# Patient Record
Sex: MALE | Race: WHITE | Employment: OTHER | URBAN - METROPOLITAN AREA
[De-identification: names, ages, dates, MRNs, and addresses within clinical notes are randomized per-mention and may not be internally consistent; named-entity substitution may affect disease eponyms.]

---

## 2017-04-05 ENCOUNTER — OFFICE VISIT (OUTPATIENT)
Dept: ORTHOPEDIC SURGERY | Age: 62
End: 2017-04-05

## 2017-04-05 VITALS
SYSTOLIC BLOOD PRESSURE: 134 MMHG | WEIGHT: 200 LBS | HEIGHT: 73 IN | HEART RATE: 63 BPM | DIASTOLIC BLOOD PRESSURE: 89 MMHG | BODY MASS INDEX: 26.51 KG/M2

## 2017-04-05 DIAGNOSIS — M25.511 ACUTE PAIN OF RIGHT SHOULDER: ICD-10-CM

## 2017-04-05 DIAGNOSIS — M19.011 PRIMARY OSTEOARTHRITIS, RIGHT SHOULDER: Primary | ICD-10-CM

## 2017-04-05 PROCEDURE — L3670 SO ACRO/CLAV CAN WEB PRE OTS: HCPCS | Performed by: ORTHOPAEDIC SURGERY

## 2017-04-05 PROCEDURE — 99213 OFFICE O/P EST LOW 20 MIN: CPT | Performed by: ORTHOPAEDIC SURGERY

## 2017-04-24 ENCOUNTER — TELEPHONE (OUTPATIENT)
Dept: ORTHOPEDIC SURGERY | Age: 62
End: 2017-04-24

## 2017-04-24 ENCOUNTER — HOSPITAL ENCOUNTER (OUTPATIENT)
Dept: PREADMISSION TESTING | Age: 62
Discharge: HOME | End: 2017-04-24
Attending: ORTHOPAEDIC SURGERY | Admitting: ORTHOPAEDIC SURGERY

## 2017-04-24 VITALS
HEART RATE: 69 BPM | TEMPERATURE: 97 F | OXYGEN SATURATION: 97 % | DIASTOLIC BLOOD PRESSURE: 81 MMHG | RESPIRATION RATE: 20 BRPM | BODY MASS INDEX: 27.57 KG/M2 | SYSTOLIC BLOOD PRESSURE: 128 MMHG | HEIGHT: 73 IN | WEIGHT: 208 LBS

## 2017-04-24 LAB
EKG ATRIAL RATE: 59 BPM
EKG DIAGNOSIS: NORMAL
EKG P AXIS: 67 DEGREES
EKG P-R INTERVAL: 198 MS
EKG Q-T INTERVAL: 406 MS
EKG QRS DURATION: 94 MS
EKG QTC CALCULATION (BAZETT): 401 MS
EKG R AXIS: 57 DEGREES
EKG T AXIS: 44 DEGREES
EKG VENTRICULAR RATE: 59 BPM

## 2017-04-24 PROCEDURE — 93010 ELECTROCARDIOGRAM REPORT: CPT | Performed by: INTERNAL MEDICINE

## 2017-04-24 RX ORDER — CHLORHEXIDINE GLUCONATE 0.12 MG/ML
15 RINSE ORAL 2 TIMES DAILY
Status: CANCELLED | OUTPATIENT
Start: 2017-04-24

## 2017-04-24 RX ORDER — SODIUM CHLORIDE, SODIUM LACTATE, POTASSIUM CHLORIDE, CALCIUM CHLORIDE 600; 310; 30; 20 MG/100ML; MG/100ML; MG/100ML; MG/100ML
INJECTION, SOLUTION INTRAVENOUS CONTINUOUS
Status: CANCELLED | OUTPATIENT
Start: 2017-04-24

## 2017-05-04 DIAGNOSIS — M75.102 ROTATOR CUFF TEAR ARTHROPATHY, LEFT: Primary | ICD-10-CM

## 2017-05-04 DIAGNOSIS — M12.812 ROTATOR CUFF TEAR ARTHROPATHY, LEFT: Primary | ICD-10-CM

## 2017-05-05 PROBLEM — Z96.619 S/P SHOULDER JOINT REPLACEMENT: Status: ACTIVE | Noted: 2017-05-05

## 2017-05-10 ENCOUNTER — OFFICE VISIT (OUTPATIENT)
Dept: ORTHOPEDIC SURGERY | Age: 62
End: 2017-05-10

## 2017-05-10 ENCOUNTER — HOSPITAL ENCOUNTER (OUTPATIENT)
Dept: PHYSICAL THERAPY | Age: 62
Discharge: HOME | End: 2017-05-31
Admitting: ORTHOPAEDIC SURGERY

## 2017-05-10 VITALS
HEART RATE: 75 BPM | BODY MASS INDEX: 27.17 KG/M2 | DIASTOLIC BLOOD PRESSURE: 83 MMHG | SYSTOLIC BLOOD PRESSURE: 134 MMHG | WEIGHT: 205 LBS | HEIGHT: 73 IN

## 2017-05-10 DIAGNOSIS — Z96.611 S/P SHOULDER REPLACEMENT, RIGHT: ICD-10-CM

## 2017-05-10 DIAGNOSIS — M25.511 RIGHT SHOULDER PAIN, UNSPECIFIED CHRONICITY: Primary | ICD-10-CM

## 2017-05-10 PROBLEM — Z96.619 S/P SHOULDER REPLACEMENT: Status: ACTIVE | Noted: 2017-05-10

## 2017-05-10 PROCEDURE — 99024 POSTOP FOLLOW-UP VISIT: CPT | Performed by: ORTHOPAEDIC SURGERY

## 2017-05-10 PROCEDURE — 73030 X-RAY EXAM OF SHOULDER: CPT | Performed by: ORTHOPAEDIC SURGERY

## 2017-05-12 ENCOUNTER — HOSPITAL ENCOUNTER (OUTPATIENT)
Dept: PHYSICAL THERAPY | Age: 62
Discharge: HOME | End: 2017-05-12
Admitting: ORTHOPAEDIC SURGERY

## 2017-05-16 ENCOUNTER — HOSPITAL ENCOUNTER (OUTPATIENT)
Dept: PHYSICAL THERAPY | Age: 62
Discharge: HOME | End: 2017-05-16
Admitting: ORTHOPAEDIC SURGERY

## 2017-05-19 ENCOUNTER — HOSPITAL ENCOUNTER (OUTPATIENT)
Dept: PHYSICAL THERAPY | Age: 62
Discharge: HOME | End: 2017-05-19
Admitting: ORTHOPAEDIC SURGERY

## 2017-05-23 ENCOUNTER — HOSPITAL ENCOUNTER (OUTPATIENT)
Dept: PHYSICAL THERAPY | Age: 62
Discharge: HOME | End: 2017-05-23
Admitting: ORTHOPAEDIC SURGERY

## 2017-05-24 ENCOUNTER — OFFICE VISIT (OUTPATIENT)
Dept: ORTHOPEDIC SURGERY | Age: 62
End: 2017-05-24

## 2017-05-24 VITALS
SYSTOLIC BLOOD PRESSURE: 139 MMHG | HEIGHT: 73 IN | HEART RATE: 70 BPM | BODY MASS INDEX: 27.17 KG/M2 | WEIGHT: 205 LBS | DIASTOLIC BLOOD PRESSURE: 81 MMHG

## 2017-05-24 DIAGNOSIS — Z96.611 S/P SHOULDER REPLACEMENT, RIGHT: Primary | ICD-10-CM

## 2017-05-24 PROCEDURE — 99024 POSTOP FOLLOW-UP VISIT: CPT | Performed by: ORTHOPAEDIC SURGERY

## 2017-06-02 ENCOUNTER — HOSPITAL ENCOUNTER (OUTPATIENT)
Dept: PHYSICAL THERAPY | Age: 62
Discharge: HOME | End: 2017-06-02
Admitting: ORTHOPAEDIC SURGERY

## 2017-06-06 ENCOUNTER — HOSPITAL ENCOUNTER (OUTPATIENT)
Dept: PHYSICAL THERAPY | Age: 62
Discharge: HOME | End: 2017-06-06
Admitting: ORTHOPAEDIC SURGERY

## 2017-06-06 DIAGNOSIS — Z96.611 S/P SHOULDER REPLACEMENT, RIGHT: Primary | ICD-10-CM

## 2017-06-06 PROCEDURE — MISCPULLYB&C PULLY'S B&C: Performed by: ORTHOPAEDIC SURGERY

## 2017-06-13 ENCOUNTER — HOSPITAL ENCOUNTER (OUTPATIENT)
Dept: PHYSICAL THERAPY | Age: 62
Discharge: HOME | End: 2017-06-13
Admitting: ORTHOPAEDIC SURGERY

## 2017-06-20 ENCOUNTER — HOSPITAL ENCOUNTER (OUTPATIENT)
Dept: PHYSICAL THERAPY | Age: 62
Discharge: HOME | End: 2017-06-20
Admitting: ORTHOPAEDIC SURGERY

## 2017-06-21 ENCOUNTER — OFFICE VISIT (OUTPATIENT)
Dept: ORTHOPEDIC SURGERY | Age: 62
End: 2017-06-21

## 2017-06-21 VITALS
HEART RATE: 72 BPM | BODY MASS INDEX: 27.17 KG/M2 | HEIGHT: 73 IN | WEIGHT: 205 LBS | DIASTOLIC BLOOD PRESSURE: 78 MMHG | SYSTOLIC BLOOD PRESSURE: 131 MMHG

## 2017-06-21 DIAGNOSIS — Z96.611 S/P SHOULDER REPLACEMENT, RIGHT: Primary | ICD-10-CM

## 2017-06-21 PROCEDURE — 99024 POSTOP FOLLOW-UP VISIT: CPT | Performed by: ORTHOPAEDIC SURGERY

## 2017-06-21 PROCEDURE — 73030 X-RAY EXAM OF SHOULDER: CPT | Performed by: ORTHOPAEDIC SURGERY

## 2017-07-03 ENCOUNTER — HOSPITAL ENCOUNTER (OUTPATIENT)
Dept: PHYSICAL THERAPY | Age: 62
Discharge: HOME | End: 2017-07-03
Admitting: ORTHOPAEDIC SURGERY

## 2017-07-10 ENCOUNTER — HOSPITAL ENCOUNTER (OUTPATIENT)
Dept: PHYSICAL THERAPY | Age: 62
Discharge: HOME | End: 2017-07-10
Admitting: ORTHOPAEDIC SURGERY

## 2017-07-17 ENCOUNTER — HOSPITAL ENCOUNTER (OUTPATIENT)
Dept: PHYSICAL THERAPY | Age: 62
Discharge: HOME | End: 2017-07-17
Admitting: ORTHOPAEDIC SURGERY

## 2017-07-24 ENCOUNTER — HOSPITAL ENCOUNTER (OUTPATIENT)
Dept: PHYSICAL THERAPY | Age: 62
Discharge: HOME | End: 2017-07-24
Admitting: ORTHOPAEDIC SURGERY

## 2017-07-26 ENCOUNTER — OFFICE VISIT (OUTPATIENT)
Dept: ORTHOPEDIC SURGERY | Age: 62
End: 2017-07-26

## 2017-07-26 VITALS
HEART RATE: 62 BPM | DIASTOLIC BLOOD PRESSURE: 81 MMHG | SYSTOLIC BLOOD PRESSURE: 125 MMHG | HEIGHT: 73 IN | BODY MASS INDEX: 27.17 KG/M2 | WEIGHT: 205 LBS

## 2017-07-26 DIAGNOSIS — Z96.611 S/P SHOULDER REPLACEMENT, RIGHT: Primary | ICD-10-CM

## 2017-07-26 DIAGNOSIS — M25.511 RIGHT SHOULDER PAIN, UNSPECIFIED CHRONICITY: ICD-10-CM

## 2017-07-26 PROCEDURE — 99024 POSTOP FOLLOW-UP VISIT: CPT | Performed by: ORTHOPAEDIC SURGERY

## 2017-08-15 ENCOUNTER — HOSPITAL ENCOUNTER (OUTPATIENT)
Dept: PHYSICAL THERAPY | Age: 62
Discharge: HOME | End: 2017-08-15
Admitting: ORTHOPAEDIC SURGERY

## 2017-08-22 ENCOUNTER — HOSPITAL ENCOUNTER (OUTPATIENT)
Dept: PHYSICAL THERAPY | Age: 62
Discharge: HOME | End: 2017-08-22
Admitting: ORTHOPAEDIC SURGERY

## 2017-09-06 ENCOUNTER — OFFICE VISIT (OUTPATIENT)
Dept: ORTHOPEDIC SURGERY | Age: 62
End: 2017-09-06

## 2017-09-06 VITALS
DIASTOLIC BLOOD PRESSURE: 83 MMHG | WEIGHT: 205 LBS | SYSTOLIC BLOOD PRESSURE: 127 MMHG | BODY MASS INDEX: 27.17 KG/M2 | HEART RATE: 68 BPM | HEIGHT: 73 IN

## 2017-09-06 DIAGNOSIS — Z96.611 S/P SHOULDER REPLACEMENT, RIGHT: Primary | ICD-10-CM

## 2017-09-06 PROCEDURE — 99213 OFFICE O/P EST LOW 20 MIN: CPT | Performed by: ORTHOPAEDIC SURGERY

## 2021-10-27 ENCOUNTER — OFFICE VISIT (OUTPATIENT)
Dept: ORTHOPEDIC SURGERY | Age: 66
End: 2021-10-27
Payer: COMMERCIAL

## 2021-10-27 VITALS — BODY MASS INDEX: 27.57 KG/M2 | HEIGHT: 73 IN | WEIGHT: 208 LBS

## 2021-10-27 DIAGNOSIS — Z96.611 STATUS POST REPLACEMENT OF RIGHT SHOULDER JOINT: Primary | ICD-10-CM

## 2021-10-27 DIAGNOSIS — M19.012 PRIMARY OSTEOARTHRITIS OF LEFT SHOULDER: ICD-10-CM

## 2021-10-27 DIAGNOSIS — M25.512 LEFT SHOULDER PAIN, UNSPECIFIED CHRONICITY: ICD-10-CM

## 2021-10-27 PROBLEM — H25.13 AGE-RELATED NUCLEAR CATARACT, BILATERAL: Status: ACTIVE | Noted: 2021-10-27

## 2021-10-27 PROBLEM — M19.011 OSTEOARTHRITIS OF RIGHT SHOULDER: Status: ACTIVE | Noted: 2017-04-28

## 2021-10-27 PROBLEM — T15.12XA FOREIGN BODY OF LEFT CONJUNCTIVAL SAC: Status: ACTIVE | Noted: 2021-10-27

## 2021-10-27 PROBLEM — R03.0 ELEVATED BLOOD PRESSURE READING WITHOUT DIAGNOSIS OF HYPERTENSION: Status: ACTIVE | Noted: 2021-10-27

## 2021-10-27 PROBLEM — J30.9 ALLERGIC RHINITIS: Status: ACTIVE | Noted: 2021-10-27

## 2021-10-27 PROBLEM — I70.90 ATHEROSCLEROSIS OF ARTERIES: Status: ACTIVE | Noted: 2019-01-21

## 2021-10-27 PROCEDURE — 20610 DRAIN/INJ JOINT/BURSA W/O US: CPT | Performed by: ORTHOPAEDIC SURGERY

## 2021-10-27 PROCEDURE — 99203 OFFICE O/P NEW LOW 30 MIN: CPT | Performed by: ORTHOPAEDIC SURGERY

## 2021-10-27 RX ORDER — METHYLPREDNISOLONE ACETATE 40 MG/ML
80 INJECTION, SUSPENSION INTRA-ARTICULAR; INTRALESIONAL; INTRAMUSCULAR; SOFT TISSUE ONCE
Status: COMPLETED | OUTPATIENT
Start: 2021-10-27 | End: 2021-10-27

## 2021-10-27 RX ORDER — LIDOCAINE HYDROCHLORIDE 10 MG/ML
8 INJECTION, SOLUTION INFILTRATION; PERINEURAL ONCE
Status: COMPLETED | OUTPATIENT
Start: 2021-10-27 | End: 2021-10-27

## 2021-10-27 RX ORDER — MELOXICAM 15 MG/1
15 TABLET ORAL DAILY PRN
Qty: 30 TABLET | Refills: 3 | Status: SHIPPED | OUTPATIENT
Start: 2021-10-27 | End: 2022-06-22 | Stop reason: ALTCHOICE

## 2021-10-27 RX ADMIN — METHYLPREDNISOLONE ACETATE 80 MG: 40 INJECTION, SUSPENSION INTRA-ARTICULAR; INTRALESIONAL; INTRAMUSCULAR; SOFT TISSUE at 16:52

## 2021-10-27 RX ADMIN — LIDOCAINE HYDROCHLORIDE 8 ML: 10 INJECTION, SOLUTION INFILTRATION; PERINEURAL at 16:51

## 2021-10-27 NOTE — PROGRESS NOTES
12 [facility]  History and Physical  Shoulder Pain    Date:  10/27/2021    Name:  Jovi Cordero  Address:  [address] [place] [address]    :  1955      Age:   63 y.o.    SSN:  [id number]      Medical Record Number:  <[id number]>    Reason for Visit:    Shoulder Pain (OP/NP LEFT SHOULDER, HX RIGHT TOTAL SHOULDER)      HPI:   Jovi Cordero is a 63 y.o. male who presents to our office today for follow up of the bilateral shoulder pain. Patient has a history of undergoing a right anatomic total shoulder arthroplasty on 2017. He reports he is doing well with the right side and has no questions or concerns regarding that side. He reports the left shoulder has been bothering him and would like to have this looked at. He feels that he has adequate movement and strength but has pain that gets worse with certain movements and at nighttime. He denies any injuries. He has not been taking any medications for this. Pain Assessment  Location of Pain: Shoulder  Location Modifiers: Left  Severity of Pain: 0  Quality of Pain: Dull, Sharp, Other (Comment)  Duration of Pain: A few minutes  Frequency of Pain: Intermittent  Aggravating Factors: Other (Comment)  Limiting Behavior: Yes  Relieving Factors: Rest  Work-Related Injury: No  Are there other pain locations you wish to document?: No    Musculoskeletal ROS: positive for - pain in left shoulder          Review of Systems:  A 14 point review of systems available in the scanned medical record as documented by the patient on 10/27/2021. The review is negative with the exception of those things mentioned in the History of Present Illness and Past Medical History. Past Medical History:  Patient's medications, allergies, past medical, surgical, social and family histories were reviewed and updated as appropriate. Allergies:   Allergies   Allergen Reactions    Ciprofloxacin Other (See Comments) cramping       Physical Exam:  There were no vitals filed for this visit. General: Raquel Gan is a healthy and well appearing 63 y.o. male who is sitting comfortably in our office in acute distress. Skin:  There are no skin lesions, cellulitis, or extreme edema. The patient has warm and well-perfused Bilateral upper extremities with brisk capillary refill. Eyes: Extra-ocular muscles intact  Mouth: Oral mucosa moist. No perioral lesions  Pulm: Respirations unlabored and regular. Neuro: Alert and oriented x3    Bilateral shoulder Exam:  Inspection:  No gross deformities, no signs of infection. Palpation: He does have glenohumeral crepitus of the left shoulder. No crepitus in the right shoulder. Left Active Range of Motion: Forward elevation of 150, abduction of 145, external rotation with elbow at the side 15/20, internal rotation to the back is T10    Right Active Range of Motion: Forward elevation of 150, external rotation with elbow at the side 15/20, internal rotation to the back is L1  Passive Range of Motion: deferred. Strength: External rotation with resistance with elbow at the side 4/5 on the right versus 5/5 on the left, internal rotation with resistance with elbow at the side 5/5, champagne test test 5/5    Special Tests:   No Onofre muscle deformity. Neurovascular: Sensation to light touch is intact, no motor deficits, palpable radial pulses 2+    Additional Examinations:    Examination of the contralateral extremity does not show any tenderness, deformity or injury. Range of motion is unremarkable. There is no gross instability. There are no rashes, ulcerations or lesions. Strength and tone are normal.      Radiographic:  3 xray views of the bilateral  shoulder including True AP in internal and external and axillary lateral were taken in our office today reveals a right anatomic total shoulder arthroplasty. All the components are in good placement without any signs of loosening. The left shoulder shows moderate to severe degenerative changes within the glenohumeral joint along with a bone spur. No fractures, dislocations, visible tumors, or signs of acute trauma. Self assessment questionnaires including ASES and Simple Shoulder Test were completed today. Assessment:  Mary Leigh is a 63 y.o. male with a history of undergoing a right anatomic total shoulder arthroplasty on 5/5/2017 which continues to do well and currently with increasing left shoulder pain related to primary osteoarthritis of the glenohumeral joint. .    Impression:  Encounter Diagnoses   Name Primary?  Status post replacement of right shoulder joint Yes    Left shoulder pain, unspecified chronicity     Primary osteoarthritis of left shoulder        Office Procedures:  Orders Placed This Encounter   Procedures    XR SHOULDER LEFT (MIN 2 VIEWS)     Standing Status:   Future     Number of Occurrences:   1     Standing Expiration Date:   10/27/2022     Order Specific Question:   Reason for exam:     Answer:   pain     Order Specific Question:   Reason for exam:     Answer:   IN WAITING AREA    XR SHOULDER RIGHT (MIN 2 VIEWS)     Standing Status:   Future     Number of Occurrences:   1     Standing Expiration Date:   10/27/2022     Order Specific Question:   Reason for exam:     Answer:   f/u    RI ARTHROCENTESIS ASPIR&/INJ MAJOR JT/BURSA W/O US     After discussing the risks and benefits of a corticosteroid injection, Kaila Cardona did state an understanding and gave verbal consent to proceed. After cleansing the injection site with Chlora-prep and using aseptic techniques,  2 CC of Depo Medrol 40mg/ml and 8 CC of 1% lidocaine were injected in the left glenohumeral joint. He  tolerated the procedure well with no immediate adverse sequelae after the injection. A bandage was placed over the injection site. Appropriate post injections instructions were given to the patient.       Plan:   Mary Leigh is doing really well with his right total shoulder and may continue to perform activities with the right arm as tolerated. In regards to the left side we feel that eventually he may need a shoulder replacement however for now he should have a trial of conservative management. We will go ahead and call in a prescription for meloxicam to his pharmacy. We will also have him do some physical therapy to optimize his left shoulder. Therapy orders were placed in the chart today. He would like to do this at the [facility] office. We also proceeded to do a corticosteroid injection which she was agreeable to. The procedure went well. We would like to see him back in 6 weeks should he have persistent symptoms. 10/27/2021  1:37 PM      Tatiana Peck PA-C  Orthopaedic Sports Medicine Physician Assistant    During this examination, I, Tatiana Peck PA-C, functioned as a scribe for Dr. Kane Rondon. This dictation was performed with a verbal recognition program (DRAGON) and it was checked for errors. It is possible that there are still dictated errors within this office note. If so, please bring any errors to my attention for an addendum. All efforts were made to ensure that this office note is accurate.  ______________  I, Dr. Kane Rondon, personally performed the services described in this documentation as described by Tatiana Peck PA-C in my presence, and it is both accurate and complete. Amada Cabrera MD, PhD  10/27/2021

## 2022-06-22 ENCOUNTER — OFFICE VISIT (OUTPATIENT)
Dept: ORTHOPEDIC SURGERY | Age: 67
End: 2022-06-22
Payer: COMMERCIAL

## 2022-06-22 VITALS — WEIGHT: 208 LBS | BODY MASS INDEX: 27.57 KG/M2 | HEIGHT: 73 IN

## 2022-06-22 DIAGNOSIS — G89.29 CHRONIC LEFT SHOULDER PAIN: Primary | ICD-10-CM

## 2022-06-22 DIAGNOSIS — M25.512 CHRONIC LEFT SHOULDER PAIN: Primary | ICD-10-CM

## 2022-06-22 PROCEDURE — 99214 OFFICE O/P EST MOD 30 MIN: CPT | Performed by: ORTHOPAEDIC SURGERY

## 2022-06-22 NOTE — PROGRESS NOTES
Chief Complaint    Follow-up (Left Shoulder)      History of Present Illness:  Gianna Moore is a now a74-year-old gentleman here for follow-up of his left shoulder. He underwent a right anatomic total shoulder arthroplasty on 5/5/2017. He has left shoulder pain since several years ago. His pain is getting worse with time. He describes his pain as dull aching quality, 5/10 in severity, affecting his day-to-day activities as well as his sleep. He has tried multiple modalities of conservative treatments including steroid injection, NSAIDs, physiotherapy, and activity modifications with no improvement in his symptoms. He is very active and he likes to do golfing and play tennis and his left shoulder pain is affecting his ability to do that. He denies numbness, paresthesia, or any other neurological symptoms. Pain Assessment  Location of Pain: Shoulder  Location Modifiers: Left  Severity of Pain: 5  Quality of Pain: Aching  Duration of Pain: Persistent  Frequency of Pain: Intermittent  Aggravating Factors:  (general use)  Limiting Behavior: Yes  Work-Related Injury: No  Are there other pain locations you wish to document?: No      Medical History:  Patient's medications, allergies, past medical, surgical, social and family histories were reviewed and updated as appropriate. No notes on file    Review of Systems  A 14 point review of systems was completed by the patient on  and is available in the media section of the scanned medical record and was reviewed on 6/22/2022. The review is negative with the exception of those things mentioned in the HPI and Past Medical History    Vital Signs:  Vitals:       General/Appearance: Alert and oriented and in no apparent distress. Skin:  There are no skin lesions, cellulitis, or extreme edema. The patient has warm and well-perfused Bilateral upper extremities with brisk capillary refill.       Left Shoulder Exam:  Inspection:  No gross deformities, no signs of infection. Palpation: No Tenderness    Active Range of Motion: Forward Elevation 170, Abduction 160, External Rotation 25, Internal Rotation L3    Passive Range of Motion:  Deffered    Strength:  External Rotation 4/5, Internal Rotation 4+/5, Champagne Toast 4+/5, Supraspinatus 4+/5    Special Tests:   No Onofre muscle deformity. Neurovascular: Sensation to light touch is intact, no motor deficits, palpable radial pulses 2+    Self assessment questionnaires were completed today. Radiology:     No new XR obtained at this time. Assessment :  Mr. Gianna Moore is a pleasant, 61 y.o. patient with left shoulder advance osteoarthritis. He is an excellent candidate for anatomic total shoulder arthroplasty    Impression:  Encounter Diagnosis   Name Primary?  Chronic left shoulder pain Yes       Office Procedures:  Orders Placed This Encounter   Procedures    CT SHOULDER LEFT WO CONTRAST     Standing Status:   Future     Standing Expiration Date:   6/22/2023     Order Specific Question:   Reason for exam:     Answer:   [facility] pre-op for shoulder surgery       Treatment Plan: Gianna Moore has left shoulder advanced osteoarthritis. His pain is affecting his day-to-day activities as well as it prevents him from doing his favorite sports. We do think he will benefit from left shoulder anatomic total shoulder arthroplasty. Patient agrees for the surgery. We will proceed and book him for surgery. And we will arrange for left shoulder CT scan for surgical planning. Risks, benefits and potential complications of left total shoulder arthroplasty surgery were discussed with the patient. Risks discussed include but are not limited to bleeding, infection, anesthetic risk, injury to nerves and blood vessels, deep vein thrombosis, residual stiffness and weakness, and the need for revision surgery.  The patient also understands that anesthetic risks include cardiopulmonary issues, drug reactions and

## 2022-07-05 ENCOUNTER — TELEPHONE (OUTPATIENT)
Dept: ORTHOPEDIC SURGERY | Age: 67
End: 2022-07-05

## 2022-08-23 ENCOUNTER — TELEPHONE (OUTPATIENT)
Dept: ORTHOPEDIC SURGERY | Age: 67
End: 2022-08-23

## 2022-08-23 NOTE — TELEPHONE ENCOUNTER
PA requsted via [employer] by online thru [facility] w/clinicals.   Reference # [id number]

## 2022-08-24 NOTE — TELEPHONE ENCOUNTER
Auth: # [id number]    Date: 09/12/22 thru 11/10/22  Type of SX:  Outpatient  Location: [facility]  CPT: 08610   DX Code: F77.974, G89.29  SX area:  shoulder  Insurance: [employer]

## 2022-08-31 ENCOUNTER — OFFICE VISIT (OUTPATIENT)
Dept: ORTHOPEDIC SURGERY | Age: 67
End: 2022-08-31
Payer: COMMERCIAL

## 2022-08-31 VITALS — HEIGHT: 73 IN | BODY MASS INDEX: 27.57 KG/M2 | WEIGHT: 208 LBS

## 2022-08-31 DIAGNOSIS — M25.512 CHRONIC LEFT SHOULDER PAIN: Primary | ICD-10-CM

## 2022-08-31 DIAGNOSIS — G89.29 CHRONIC LEFT SHOULDER PAIN: Primary | ICD-10-CM

## 2022-08-31 DIAGNOSIS — M19.012 PRIMARY OSTEOARTHRITIS OF LEFT SHOULDER: ICD-10-CM

## 2022-08-31 DIAGNOSIS — M25.512 LEFT SHOULDER PAIN, UNSPECIFIED CHRONICITY: ICD-10-CM

## 2022-08-31 PROCEDURE — L3670 SO ACRO/CLAV CAN WEB PRE OTS: HCPCS | Performed by: ORTHOPAEDIC SURGERY

## 2022-08-31 PROCEDURE — 99214 OFFICE O/P EST MOD 30 MIN: CPT | Performed by: ORTHOPAEDIC SURGERY

## 2022-08-31 NOTE — PROGRESS NOTES
Reactions    Ciprofloxacin Other (See Comments)     cramping       Physical Exam:  There were no vitals filed for this visit. General: Renetta Angulo is a healthy and well appearing 61 y.o. male who is sitting comfortably in our office in acute distress. Skin:  There are no skin lesions, cellulitis, or extreme edema. The patient has warm and well-perfused Bilateral upper extremities with brisk capillary refill. Eyes: Extra-ocular muscles intact  Mouth: Oral mucosa moist. No perioral lesions  Pulm: Respirations unlabored and regular. Neuro: Alert and oriented x3    left Shoulder Exam:  Inspection: No gross deformities, no signs of infection. Palpation:  There is glenohumeral crepitus. Tenderness to palpation over the joint line and bicipital groove. Non-tender to palpation over the rotator cuff footprint and ac joint. Active Range of Motion: Forward elevation of 120, abduction of 140, external rotation with elbow at the side 20 vs 40, internal rotation to the back is L4    Passive Range of Motion: deferred    Strength: External rotation with resistance with elbow at the side +4/5, internal rotation with resistance with elbow at the side +4/5    Special Tests:  Negative [place]. No Onofre muscle deformity. Neurovascular: Sensation to light touch is intact, no motor deficits, palpable radial pulses 2+    Additional Examinations:    Examination of the contralateral extremity does not show any tenderness, deformity or injury. Range of motion is unremarkable. There is no gross instability. There are no rashes, ulcerations or lesions. Strength and tone are normal.      Radiographic:  CT scan 8/29/2022     FINDINGS:  End-stage chondromalacia with degenerative arthrosis at the glenohumeral joint. Subarticular eburnation, osteophytosis, subchondral plate remodeling and numerous foci of    heterotopic ossification associated with the glenohumeral capsule.   Ossifications are present    within the bicipital sheath as well. No occult fracture. Moderate degenerative arthrosis at the [facility] joint. Calcified granuloma within the left lower lobe. CONCLUSION:   End-stage chondromalacia and degenerative arthrosis left glenohumeral joint. Thank you for the opportunity to provide your interpretation. Assessment:  Dionna Wheeler is a 61 y.o. male with left shoulder pain related to primary osteoarthritis of the glenohumeral joint. He is a great candidate for anatomic left total shoulder arthroplasty. Impression:  Encounter Diagnoses   Name Primary? Chronic left shoulder pain Yes    Left shoulder pain, unspecified chronicity        Office Procedures:  Orders Placed This Encounter   Procedures    DJO ultrasling IV Shoulder Sling     Patient was prescribed a DJO Ultrasling IV Shoulder Brace. The left shoulder will require stabilization / immobilization from this orthosis. The orthosis will assist in protecting the affected area, provide functional support and facilitate healing. The device was ordered and fit on 08/31/2022. The patient was educated and fit by a healthcare professional with expert knowledge and specialization in brace application while under the direct supervision of the treating physician. Verbal and written instructions for the use of and application of this item were provided. They were instructed to contact the office immediately should the brace result in increased pain, decreased sensation, increased swelling or worsening of the condition. Plan: We reviewed the CT scan with Dionna Wheeler today. He has failed conservative treatment and is an excellent candidate for an anatomic total shoulder arthroplasty. We discussed the surgery in full detail along with risk benefits and postoperative recovery. The patient has opted to go with surgery today - he know what to expect because he has a well functioning shoulder replacement on the contralateral side. We have fitted him with an UltraSling brace that he was asked to bring with him on the day of surgery. Risks, benefits and potential complications of total shoulder arthroplasty surgery were discussed with the patient. Risks discussed include but are not limited to bleeding, infection, anesthetic risk, injury to nerves and blood vessels, deep vein thrombosis, residual stiffness and weakness, and the need for revision surgery. The patient also understands that anesthetic risks include cardiopulmonary issues, drug reactions and even death. The patient voices an understanding of the importance of physical therapy and home exercises after surgery. All questions were answered and written informed consent for surgery was obtained today. Susanne Casey will follow up in 2 weeks and/or as needed. They were in agreement with this plan and all questions were answered to the patient's satisfaction. They were encouraged to call with any questions. 8/31/2022  9:20 AM      Dino Osgood, PA-C  Orthopaedic Sports Medicine Physician Assistant    During this examination, I, Dino Osgood, PA-C, functioned as a scribe for Dr. Fredy Montes. This dictation was performed with a verbal recognition program (DRAGON) and it was checked for errors. It is possible that there are still dictated errors within this office note. If so, please bring any errors to my attention for an addendum. All efforts were made to ensure that this office note is accurate.  ______________  I, Dr. Fredy Montes, personally performed the services described in this documentation as described by Dino Osgood, PA-C in my presence, and it is both accurate and complete. Amada Conteh MD, PhD  8/31/2022

## 2022-09-01 ENCOUNTER — TELEPHONE (OUTPATIENT)
Dept: ORTHOPEDIC SURGERY | Age: 67
End: 2022-09-01

## 2022-09-01 DIAGNOSIS — Z01.818 PREOP EXAMINATION: Primary | ICD-10-CM

## 2022-09-01 NOTE — TELEPHONE ENCOUNTER
Orthopedic Nurse Navigator Summary    COVID:  Vaccinated and booster    Patient Name:  Jovi Cordero  Anticipated Date of Surgery:  09/12/22  Attended Pre-op Education Class:  Video sent to patient email- he will watch it  PCP: Mily Moreno DO  Date of PCP visit for H&P: 08/25/22  Is patient in a Pain Management program: No  Review of Medical history reveals history of: Prediabetes, Hyperlipidemia, history of Prostate CA    Critical Lab Values  - Hemoglobin (g/dL):  Date: 08/25/22 Value 14.5  - Hematocrit(%): Date: 08/25/22  Value 44.6  - HgbA1C:  Date: 08/25/22 Value 5.2  - Albumin:  Date:  08/25/22  Value 4.3  - BUN:  Date: 08/25/22  Value 18  - Creatinine:  Date: 08/25/22  Value 0.82    MRSA swab 09/02/22- negative    Coronary Artery Disease/HTN/CHF history: No  Cardiologist: NO  Cardiac clearance necessary:  No  Date of cardiac clearance appt:  Final Cardiac recommendations: On any anticoagulation: Aspirin 81 mg QD, he will stop taking this 09/05/22    Diabetes History:  Prediabetes  Most recent HgbA1C: 5.2  Pulmonary:  COPD/Emphysema/Use of home oxygen: No  Alcohol use: No    BMI greater than 40 at time of scheduling: Additional medical concerns:   Additional recommendations for above concerns:  Attended Pre-Hab program:    Anticipated Discharge Disposition:  Home with OPT  Who will be with patient at home following discharge:  wife  Equipment patient already has:  sling  Bedroom on first or second floor:  first  Bathroom on first or second floor:  first  Weight bearing status:  nwb Lt arm, otherwise fwb  Pre-op ambulatory status: no issues  Number of entry steps:  1  Caregiver assistance:  full time    Shelbie Silva RN  Date:   09/01/22

## 2022-09-02 LAB
HB: SOURCE: NORMAL
STAPH AUREUS.METHICILLIN RESISTANT DNA: NEGATIVE

## 2022-09-02 NOTE — PROGRESS NOTES
Place patient label inside box (if no patient label, complete below)  Name:  :  MR#:   Jonathan Ruvalcaba [id number] / PROCEDURE  I (we), Perez Jesus (Patient Name) authorize Marielle Casillas (Provider / Enrigue Blowers) and/or such assistants as may be selected by him/her, to perform the following operation/procedure(s): LEFT TOTAL SHOULDER REPLACEMENT       Note: If unable to obtain consent prior to an emergent procedure, document the emergent reason in the medical record. This procedure has been explained to my (our) satisfaction and included in the explanation was: The intended benefit, nature, and extent of the procedure to be performed; The significant risks involved and the probability of success; Alternative procedures and methods of treatment; The dangers and probable consequences of such alternatives (including no procedure or treatment); The expected consequences of the procedure on my future health; Whether other qualified individuals would be performing important surgical tasks and/or whether  would be present to advise or support the procedure. I (we) understand that there are other risks of infection and other serious complications in the pre-operative/procedural and postoperative/procedural stages of my (our) care. I (we) have asked all of the questions which I (we) thought were important in deciding whether or not to undergo treatment or diagnosis. These questions have been answered to my (our) satisfaction. I (we) understand that no assurance can be given that the procedure will be a success, and no guarantee or warranty of success has been given to me (us). It has been explained to me (us) that during the course of the operation/procedure, unforeseen conditions may be revealed that necessitate extension of the original procedure(s) or different procedure(s) than those set forth in Paragraph 1.  I (we) authorize and request that the above-named physician, his/her assistants or his/her designees, perform procedures as necessary and desirable if deemed to be in my (our) best interest.     Revised 8/2/2021                                                                          Page 1 of 2         I acknowledge that health care personnel may be observing this procedure for the purpose of medical education or other specified purposes as may be necessary as requested and/or approved by my (our) physician. I (we) consent to the disposal by the hospital Pathologist of the removed tissue, parts or organs in accordance with hospital policy. I do ____ do not ____ consent to the use of a local infiltration pain blocking agent that will be used by my provider/surgical provider to help alleviate pain during my procedure. I do ____ do not ____ consent to an emergent blood transfusion in the case of a life-threatening situation that requires blood components to be administered. This consent is valid for 24 hours from the beginning of the procedure. This patient does ____ or does not ____ currently have a DNR status/order. If DNR order is in place, obtain Addendum to the Surgical Consent for ALL Patients with a DNR Order to address carter-operative status for limited intervention or DNR suspension.      I have read and fully understand the above Consent for Operation/Procedure and that all blanks were completed before I signed the consent.   _____________________________       _____________________      ____/____am/pm  Signature of Patient or legal representative      Printed Name / Relationship            Date / Time   ____________________________       _____________________      ____/____am/pm  Witness to Signature                                    Printed Name                    Date / Time    If patient is unable to sign or is a minor, complete the following)  Patient is a minor, ____ years of age, or unable to sign because: ______________________________________________________________________________________________    If a phone consent is obtained, consent will be documented by using two health care professionals, each affirming that the consenting party has no questions and gives consent for the procedure discussed with the physician/provider.   _____________________          ____________________       _____/_____am/pm   2nd witness to phone consent        Printed name           Date / Time    Informed Consent:  I have provided the explanation described above in section 1 to the patient and/or legal representative.  I have provided the patient and/or legal representative with an opportunity to ask any questions about the proposed operation/procedure.   ___________________________          ____________________         ____/____am/pm  Provider / Proceduralist                            Printed name            Date / Time  Revised 8/2/2021                                                                      Page 2 of 2

## 2022-09-02 NOTE — PROGRESS NOTES
[facility] PRE-SURGICAL TESTING INSTRUCTIONS                      PRIOR TO PROCEDURE DATE:    1. PLEASE FOLLOW ANY INSTRUCTIONS GIVEN TO YOU PER YOUR SURGEON. 2. Arrange for someone to drive you home and be with you for the first 24 hours after discharge for your safety after your procedure for which you received sedation. Ensure it is someone we can share information with regarding your discharge. NOTE: At this time ONLY 2 ADULTS may accompany you & everyone must be MASKED. 3. You must contact your surgeon for instructions IF:  You are taking any blood thinners, aspirin, anti-inflammatory or vitamins. There is a change in your physical condition such as a cold, fever, rash, cuts, sores, or any other infection, especially near your surgical site. 4. Do not drink alcohol the day before or day of your procedure. Do not use any recreational marijuana at least 24 hours or street drugs (heroin, cocaine) at minimum 5 days prior to your procedure. 5. A Pre-Surgical History and Physical MUST be completed WITHIN 30 DAYS OR LESS prior to your procedure. by your Physician or an Urgent Care        THE DAY OF YOUR PROCEDURE:  1. Follow instructions for ARRIVAL TIME as DIRECTED BY YOUR SURGEON. 2. Enter the MAIN entrance from [employer] and follow the signs to the free Parking Garage or [employer] (offered free of charge 7 am-5pm). 3. Enter the Main Entrance of the hospital (do not enter from the lower level of the parking garage). Upon entrance, check in with the  at the surgical information desk on your LEFT. Bring your insurance card and photo ID to register      4. DO NOT EAT ANYTHING 8 hours prior to arrival for surgery. You may have up to 8 ounces of water 4 hours prior to your arrival for surgery.    NOTE: ALL Gastric, Bariatric & Bowel surgery patients - you MUST follow your surgeon's instructions regarding eating/ drinking as you will have very specific instructions to follow. If you did not receive these, call your surgeon's office immediately. 5. MEDICATIONS:  Take the following medications with a SMALL sip of water: none  Use your usual dose of inhalers the morning of surgery. BRING your rescue inhaler with you to hospital.   Anesthesia does NOT want you to take insulin the morning of surgery. They will control your blood sugar while you are at the hospital. Please contact your ordering physician for instructions regarding your insulin the night before your procedure. If you have an insulin pump, please keep it set on basal rate. Bariatric patient's call your surgeon if on diabetic medications as some may need to be stopped 1 week prior to surgery    6. Do not swallow additional water when brushing teeth. No gum, candy, mints, or ice chips. Refrain from smoking or at least decrease the amount on day of surgery. 7. Morning of surgery:   Take a shower with an antibacterial soap (i.e., Safeguard or Dial) OR your physician may have instructed you to use Hibiclens. Dress in loose, comfortable clothing appropriate for redressing after your procedure. Do not wear jewelry (including body piercings), make-up (especially NO eye make-up), fingernail polish (NO toenail polish if foot/leg surgery), lotion, powders, or metal hairclips. Do not shave or wax for 72 hours prior to procedure near your operative site. Shaving with a razor can irritate your skin and make it easier to develop an infection. On the day of your procedure, any hair that needs to be removed near the surgical site will be 'clipped' by a healthcare worker using a special clipper designed to avoid skin irritation. 8. Dentures, glasses, or contacts will need to be removed before your procedure. Bring cases for your glasses, contacts, dentures, or hearing aids to protect them while you are in surgery. 9. If you use a CPAP, please bring it with you on the day of your procedure.     10. We recommend that valuable personal belongings such as cash, cell phones, e-tablets, or jewelry, be left at home during your stay. The hospital will not be responsible for valuables that are not secured in the hospital safe. However, if your insurance requires a co-pay, you may want to bring a method of payment, i.e., Check or credit card, if you wish to pay your co-pay the day of surgery. 11. If you are to stay overnight, you may bring a bag with personal items. Please have any large items you may need brought in by your family after your arrival to your hospital room. 12. If you have a Living Will or Durable Power of , please bring a copy on the day of your procedure. How we keep you safe and work to prevent surgical site infections:   1. Health care workers should always check your ID bracelet to verify your name and birth date. You will be asked many times to state your name, date of birth, and allergies. 2. Health care workers should always clean their hands with soap or alcohol gel before providing care to you. It is okay to ask anyone if they cleaned their hands before they touch you. 3. You will be actively involved in verifying the type of procedure you are having and ensuring the correct surgical site. This will be confirmed multiple times prior to your procedure. Do NOT joaquina your surgery site UNLESS instructed to by your surgeon. 4. When you are in the operating room, your surgical site will be cleansed with a special soap, and in most cases, you will be given an antibiotic before the surgery begins. What to expect AFTER your procedure? 1. Immediately following your procedure, your will be taken to the PACU for the first phase of your recovery. Your nurse will help you recover from any potential side effects of anesthesia, such as extreme drowsiness, changes in your vital signs or breathing patterns.  Nausea, headache, muscle aches, or sore throat may also occur after anesthesia. Your nurse will help you manage these potential side effects. 2. For comfort and safety, arrange to have someone at home with you for the first 24 hours after discharge. 3. You and your family will be given written instructions about your diet, activity, dressing care, medications, and return visits. 4. Once at home, should issues with nausea, pain, or bleeding occur, or should you notice any signs of infection, you should call your surgeon. 5. Always clean your hands before and after caring for your wound. Do not let your family touch your surgery site without cleaning their hands. 6. Narcotic pain medications can cause significant constipation. You may want to add a stool softener to your postoperative medication schedule or speak to your surgeon on how best to manage this SIDE EFFECT. SPECIAL INSTRUCTIONS     Thank you for allowing us to care for you. We strive to exceed your expectations in the delivery of care and service provided to you and your family. If you need to contact the [place] [address] staff for any reason, please call us at [phone]    Instructions reviewed with patient during preadmission testing phone interview.   Sandra Fisher RN.9/2/2022 .2:05 PM      ADDITIONAL EDUCATIONAL INFORMATION REVIEWED PER PHONE WITH YOU AND/OR YOUR FAMILY:  Yes Hibiclens® Bathing Instructions   Yes Antibacterial Soap

## 2022-09-09 ENCOUNTER — ANESTHESIA EVENT (OUTPATIENT)
Dept: OPERATING ROOM | Age: 67
End: 2022-09-09
Payer: COMMERCIAL

## 2022-09-09 NOTE — PROGRESS NOTES
9/9/22  @ 1354   I called PCP office to obtain EKG tracing  and they are closed. In media office sent over faxes but only cover page received. I found EKG from 2017 that was ordered by Dr Anita Mccord.   PCP put interpretation in H&P of EKG done 8/25/22.  Doron Robertson

## 2022-09-12 ENCOUNTER — APPOINTMENT (OUTPATIENT)
Dept: GENERAL RADIOLOGY | Age: 67
End: 2022-09-12
Attending: ORTHOPAEDIC SURGERY
Payer: COMMERCIAL

## 2022-09-12 ENCOUNTER — TELEPHONE (OUTPATIENT)
Dept: ORTHOPEDIC SURGERY | Age: 67
End: 2022-09-12

## 2022-09-12 ENCOUNTER — HOSPITAL ENCOUNTER (OUTPATIENT)
Age: 67
Setting detail: OBSERVATION
Discharge: HOME | End: 2022-09-13
Attending: ORTHOPAEDIC SURGERY | Admitting: ORTHOPAEDIC SURGERY
Payer: COMMERCIAL

## 2022-09-12 ENCOUNTER — ANESTHESIA (OUTPATIENT)
Dept: OPERATING ROOM | Age: 67
End: 2022-09-12
Payer: COMMERCIAL

## 2022-09-12 DIAGNOSIS — Z96.612 STATUS POST REPLACEMENT OF LEFT SHOULDER JOINT: Primary | ICD-10-CM

## 2022-09-12 PROBLEM — Z98.890 POST-OPERATIVE STATE: Status: ACTIVE | Noted: 2022-09-12

## 2022-09-12 PROBLEM — M75.100 ROTATOR CUFF TEAR: Status: ACTIVE | Noted: 2022-09-12

## 2022-09-12 LAB
ABO/RH: NORMAL
ANTIBODY SCREEN: NORMAL
APTT: 27.5 SEC (ref 23–34.3)
BILIRUBIN URINE: NEGATIVE
BLOOD, URINE: NEGATIVE
CLARITY: CLEAR
COLOR: YELLOW
GLUCOSE URINE: NEGATIVE MG/DL
INR BLD: 0.94 (ref 0.87–1.14)
KETONES, URINE: NEGATIVE MG/DL
LEUKOCYTE ESTERASE, URINE: NEGATIVE
MICROSCOPIC EXAMINATION: NORMAL
NITRITE, URINE: NEGATIVE
PH UA: 6 (ref 5–8)
PROTEIN UA: NEGATIVE MG/DL
PROTHROMBIN TIME: 12.5 SEC (ref 11.7–14.5)
SPECIFIC GRAVITY UA: >=1.03 (ref 1–1.03)
URINE TYPE: NORMAL
UROBILINOGEN, URINE: 0.2 E.U./DL

## 2022-09-12 PROCEDURE — 3700000000 HC ANESTHESIA ATTENDED CARE: Performed by: ORTHOPAEDIC SURGERY

## 2022-09-12 PROCEDURE — 86850 RBC ANTIBODY SCREEN: CPT

## 2022-09-12 PROCEDURE — 73020 X-RAY EXAM OF SHOULDER: CPT

## 2022-09-12 PROCEDURE — G0378 HOSPITAL OBSERVATION PER HR: HCPCS

## 2022-09-12 PROCEDURE — 3600000014 HC SURGERY LEVEL 4 ADDTL 15MIN: Performed by: ORTHOPAEDIC SURGERY

## 2022-09-12 PROCEDURE — C1713 ANCHOR/SCREW BN/BN,TIS/BN: HCPCS | Performed by: ORTHOPAEDIC SURGERY

## 2022-09-12 PROCEDURE — C1776 JOINT DEVICE (IMPLANTABLE): HCPCS | Performed by: ORTHOPAEDIC SURGERY

## 2022-09-12 PROCEDURE — 97535 SELF CARE MNGMENT TRAINING: CPT

## 2022-09-12 PROCEDURE — 97530 THERAPEUTIC ACTIVITIES: CPT

## 2022-09-12 PROCEDURE — 86900 BLOOD TYPING SEROLOGIC ABO: CPT

## 2022-09-12 PROCEDURE — 2720000010 HC SURG SUPPLY STERILE: Performed by: ORTHOPAEDIC SURGERY

## 2022-09-12 PROCEDURE — 7100000001 HC PACU RECOVERY - ADDTL 15 MIN: Performed by: ORTHOPAEDIC SURGERY

## 2022-09-12 PROCEDURE — 81003 URINALYSIS AUTO W/O SCOPE: CPT

## 2022-09-12 PROCEDURE — 97116 GAIT TRAINING THERAPY: CPT

## 2022-09-12 PROCEDURE — 6360000002 HC RX W HCPCS: Performed by: ORTHOPAEDIC SURGERY

## 2022-09-12 PROCEDURE — 97166 OT EVAL MOD COMPLEX 45 MIN: CPT

## 2022-09-12 PROCEDURE — 3600000004 HC SURGERY LEVEL 4 BASE: Performed by: ORTHOPAEDIC SURGERY

## 2022-09-12 PROCEDURE — 6360000002 HC RX W HCPCS: Performed by: NURSE ANESTHETIST, CERTIFIED REGISTERED

## 2022-09-12 PROCEDURE — 6360000002 HC RX W HCPCS: Performed by: ANESTHESIOLOGY

## 2022-09-12 PROCEDURE — 85730 THROMBOPLASTIN TIME PARTIAL: CPT

## 2022-09-12 PROCEDURE — C1769 GUIDE WIRE: HCPCS | Performed by: ORTHOPAEDIC SURGERY

## 2022-09-12 PROCEDURE — 2709999900 HC NON-CHARGEABLE SUPPLY: Performed by: ORTHOPAEDIC SURGERY

## 2022-09-12 PROCEDURE — 97162 PT EVAL MOD COMPLEX 30 MIN: CPT

## 2022-09-12 PROCEDURE — L3660 SO 8 AB RSTR CAN/WEB PRE OTS: HCPCS | Performed by: ORTHOPAEDIC SURGERY

## 2022-09-12 PROCEDURE — 85610 PROTHROMBIN TIME: CPT

## 2022-09-12 PROCEDURE — 6370000000 HC RX 637 (ALT 250 FOR IP): Performed by: ORTHOPAEDIC SURGERY

## 2022-09-12 PROCEDURE — 2580000003 HC RX 258: Performed by: ORTHOPAEDIC SURGERY

## 2022-09-12 PROCEDURE — 2580000003 HC RX 258: Performed by: NURSE ANESTHETIST, CERTIFIED REGISTERED

## 2022-09-12 PROCEDURE — 2500000003 HC RX 250 WO HCPCS: Performed by: NURSE ANESTHETIST, CERTIFIED REGISTERED

## 2022-09-12 PROCEDURE — 87086 URINE CULTURE/COLONY COUNT: CPT

## 2022-09-12 PROCEDURE — 3700000001 HC ADD 15 MINUTES (ANESTHESIA): Performed by: ORTHOPAEDIC SURGERY

## 2022-09-12 PROCEDURE — 86901 BLOOD TYPING SEROLOGIC RH(D): CPT

## 2022-09-12 PROCEDURE — 6360000002 HC RX W HCPCS

## 2022-09-12 PROCEDURE — 64415 NJX AA&/STRD BRCH PLXS IMG: CPT | Performed by: ANESTHESIOLOGY

## 2022-09-12 PROCEDURE — 2580000003 HC RX 258: Performed by: FAMILY MEDICINE

## 2022-09-12 PROCEDURE — 7100000000 HC PACU RECOVERY - FIRST 15 MIN: Performed by: ORTHOPAEDIC SURGERY

## 2022-09-12 PROCEDURE — 6370000000 HC RX 637 (ALT 250 FOR IP): Performed by: STUDENT IN AN ORGANIZED HEALTH CARE EDUCATION/TRAINING PROGRAM

## 2022-09-12 PROCEDURE — 2500000003 HC RX 250 WO HCPCS: Performed by: ANESTHESIOLOGY

## 2022-09-12 PROCEDURE — C9290 INJ, BUPIVACAINE LIPOSOME: HCPCS | Performed by: ANESTHESIOLOGY

## 2022-09-12 DEVICE — IMPL CAPPED SHOULDER S1 TOTAL STD ANATOMIC DJO: Type: IMPLANTABLE DEVICE | Site: SHOULDER | Status: FUNCTIONAL

## 2022-09-12 DEVICE — COBALT HV BONE CEMENT 40GM
Type: IMPLANTABLE DEVICE | Site: SHOULDER | Status: FUNCTIONAL
Brand: DJO SURGICAL

## 2022-09-12 DEVICE — IMPLANTABLE DEVICE: Type: IMPLANTABLE DEVICE | Site: SHOULDER | Status: FUNCTIONAL

## 2022-09-12 DEVICE — ALTIVATE ANATOMIC, ALL-POLY PEGGED GLENOID, SIZE 54, E-PLUS
Type: IMPLANTABLE DEVICE | Site: SHOULDER | Status: FUNCTIONAL
Brand: DJO SURGICAL

## 2022-09-12 RX ORDER — CELECOXIB 200 MG/1
400 CAPSULE ORAL ONCE
Status: COMPLETED | OUTPATIENT
Start: 2022-09-12 | End: 2022-09-12

## 2022-09-12 RX ORDER — PHENYLEPHRINE HYDROCHLORIDE 10 MG/ML
INJECTION INTRAVENOUS PRN
Status: DISCONTINUED | OUTPATIENT
Start: 2022-09-12 | End: 2022-09-12 | Stop reason: SDUPTHER

## 2022-09-12 RX ORDER — ACETAMINOPHEN 500 MG
1000 TABLET ORAL ONCE
Status: COMPLETED | OUTPATIENT
Start: 2022-09-12 | End: 2022-09-12

## 2022-09-12 RX ORDER — OXYCODONE HYDROCHLORIDE AND ACETAMINOPHEN 5; 325 MG/1; MG/1
1 TABLET ORAL EVERY 6 HOURS PRN
Qty: 28 TABLET | Refills: 0 | Status: SHIPPED | OUTPATIENT
Start: 2022-09-12 | End: 2022-09-19

## 2022-09-12 RX ORDER — ASPIRIN 81 MG/1
81 TABLET, CHEWABLE ORAL DAILY
Status: ON HOLD | COMMUNITY
End: 2022-09-12 | Stop reason: HOSPADM

## 2022-09-12 RX ORDER — ATORVASTATIN CALCIUM 20 MG/1
20 TABLET, FILM COATED ORAL DAILY
Status: DISCONTINUED | OUTPATIENT
Start: 2022-09-12 | End: 2022-09-13 | Stop reason: HOSPADM

## 2022-09-12 RX ORDER — ONDANSETRON 4 MG/1
4 TABLET, FILM COATED ORAL 3 TIMES DAILY PRN
Qty: 15 TABLET | Refills: 1 | Status: SHIPPED | OUTPATIENT
Start: 2022-09-12 | End: 2022-10-25

## 2022-09-12 RX ORDER — CEFOXITIN 2 G/1
2000 INJECTION, POWDER, FOR SOLUTION INTRAVENOUS ONCE
Status: DISCONTINUED | OUTPATIENT
Start: 2022-09-12 | End: 2022-09-12

## 2022-09-12 RX ORDER — SODIUM CHLORIDE 0.9 % (FLUSH) 0.9 %
5-40 SYRINGE (ML) INJECTION EVERY 12 HOURS SCHEDULED
Status: DISCONTINUED | OUTPATIENT
Start: 2022-09-12 | End: 2022-09-13 | Stop reason: HOSPADM

## 2022-09-12 RX ORDER — GABAPENTIN 300 MG/1
300 CAPSULE ORAL ONCE
Status: COMPLETED | OUTPATIENT
Start: 2022-09-12 | End: 2022-09-12

## 2022-09-12 RX ORDER — SODIUM CHLORIDE 9 MG/ML
INJECTION, SOLUTION INTRAVENOUS PRN
Status: DISCONTINUED | OUTPATIENT
Start: 2022-09-12 | End: 2022-09-12 | Stop reason: HOSPADM

## 2022-09-12 RX ORDER — PROCHLORPERAZINE EDISYLATE 5 MG/ML
5 INJECTION INTRAMUSCULAR; INTRAVENOUS
Status: DISCONTINUED | OUTPATIENT
Start: 2022-09-12 | End: 2022-09-12 | Stop reason: HOSPADM

## 2022-09-12 RX ORDER — ASPIRIN 325 MG
325 TABLET, DELAYED RELEASE (ENTERIC COATED) ORAL 2 TIMES DAILY
Qty: 60 TABLET | Refills: 0 | Status: SHIPPED | OUTPATIENT
Start: 2022-09-12 | End: 2022-10-25

## 2022-09-12 RX ORDER — MEPERIDINE HYDROCHLORIDE 25 MG/ML
12.5 INJECTION INTRAMUSCULAR; INTRAVENOUS; SUBCUTANEOUS AS NEEDED
Status: DISCONTINUED | OUTPATIENT
Start: 2022-09-12 | End: 2022-09-12 | Stop reason: HOSPADM

## 2022-09-12 RX ORDER — SODIUM CHLORIDE 0.9 % (FLUSH) 0.9 %
5-40 SYRINGE (ML) INJECTION EVERY 12 HOURS SCHEDULED
Status: DISCONTINUED | OUTPATIENT
Start: 2022-09-12 | End: 2022-09-12 | Stop reason: HOSPADM

## 2022-09-12 RX ORDER — DOXYCYCLINE HYCLATE 100 MG
100 TABLET ORAL 2 TIMES DAILY
Qty: 10 TABLET | Refills: 0 | Status: SHIPPED | OUTPATIENT
Start: 2022-09-12 | End: 2022-09-17

## 2022-09-12 RX ORDER — SODIUM CHLORIDE, SODIUM LACTATE, POTASSIUM CHLORIDE, CALCIUM CHLORIDE 600; 310; 30; 20 MG/100ML; MG/100ML; MG/100ML; MG/100ML
INJECTION, SOLUTION INTRAVENOUS CONTINUOUS PRN
Status: DISCONTINUED | OUTPATIENT
Start: 2022-09-12 | End: 2022-09-12 | Stop reason: SDUPTHER

## 2022-09-12 RX ORDER — OXYCODONE HYDROCHLORIDE 5 MG/1
5 TABLET ORAL EVERY 4 HOURS PRN
Status: DISCONTINUED | OUTPATIENT
Start: 2022-09-12 | End: 2022-09-13 | Stop reason: HOSPADM

## 2022-09-12 RX ORDER — ROCURONIUM BROMIDE 10 MG/ML
INJECTION, SOLUTION INTRAVENOUS PRN
Status: DISCONTINUED | OUTPATIENT
Start: 2022-09-12 | End: 2022-09-12 | Stop reason: SDUPTHER

## 2022-09-12 RX ORDER — HYDRALAZINE HYDROCHLORIDE 20 MG/ML
10 INJECTION INTRAMUSCULAR; INTRAVENOUS
Status: DISCONTINUED | OUTPATIENT
Start: 2022-09-12 | End: 2022-09-12 | Stop reason: HOSPADM

## 2022-09-12 RX ORDER — PREGABALIN 150 MG/1
150 CAPSULE ORAL ONCE
Status: COMPLETED | OUTPATIENT
Start: 2022-09-12 | End: 2022-09-12

## 2022-09-12 RX ORDER — ONDANSETRON 4 MG/1
4 TABLET, ORALLY DISINTEGRATING ORAL EVERY 8 HOURS PRN
Status: DISCONTINUED | OUTPATIENT
Start: 2022-09-12 | End: 2022-09-13 | Stop reason: HOSPADM

## 2022-09-12 RX ORDER — SENNA AND DOCUSATE SODIUM 50; 8.6 MG/1; MG/1
1 TABLET, FILM COATED ORAL 2 TIMES DAILY
Status: DISCONTINUED | OUTPATIENT
Start: 2022-09-12 | End: 2022-09-13 | Stop reason: HOSPADM

## 2022-09-12 RX ORDER — ONDANSETRON 2 MG/ML
INJECTION INTRAMUSCULAR; INTRAVENOUS
Status: COMPLETED
Start: 2022-09-12 | End: 2022-09-12

## 2022-09-12 RX ORDER — LIDOCAINE HYDROCHLORIDE 20 MG/ML
INJECTION, SOLUTION INTRAVENOUS PRN
Status: DISCONTINUED | OUTPATIENT
Start: 2022-09-12 | End: 2022-09-12 | Stop reason: SDUPTHER

## 2022-09-12 RX ORDER — BUPIVACAINE HYDROCHLORIDE 5 MG/ML
INJECTION, SOLUTION EPIDURAL; INTRACAUDAL
Status: COMPLETED
Start: 2022-09-12 | End: 2022-09-12

## 2022-09-12 RX ORDER — SODIUM CHLORIDE 0.9 % (FLUSH) 0.9 %
5-40 SYRINGE (ML) INJECTION PRN
Status: DISCONTINUED | OUTPATIENT
Start: 2022-09-12 | End: 2022-09-12 | Stop reason: HOSPADM

## 2022-09-12 RX ORDER — DEXAMETHASONE SODIUM PHOSPHATE 4 MG/ML
INJECTION, SOLUTION INTRA-ARTICULAR; INTRALESIONAL; INTRAMUSCULAR; INTRAVENOUS; SOFT TISSUE PRN
Status: DISCONTINUED | OUTPATIENT
Start: 2022-09-12 | End: 2022-09-12 | Stop reason: SDUPTHER

## 2022-09-12 RX ORDER — FENTANYL CITRATE 50 UG/ML
INJECTION, SOLUTION INTRAMUSCULAR; INTRAVENOUS PRN
Status: DISCONTINUED | OUTPATIENT
Start: 2022-09-12 | End: 2022-09-12 | Stop reason: SDUPTHER

## 2022-09-12 RX ORDER — OXYCODONE HYDROCHLORIDE 5 MG/1
10 TABLET ORAL EVERY 4 HOURS PRN
Status: DISCONTINUED | OUTPATIENT
Start: 2022-09-12 | End: 2022-09-13 | Stop reason: HOSPADM

## 2022-09-12 RX ORDER — FENTANYL CITRATE 50 UG/ML
INJECTION, SOLUTION INTRAMUSCULAR; INTRAVENOUS
Status: COMPLETED
Start: 2022-09-12 | End: 2022-09-12

## 2022-09-12 RX ORDER — VANCOMYCIN HYDROCHLORIDE 1 G/20ML
INJECTION, POWDER, LYOPHILIZED, FOR SOLUTION INTRAVENOUS PRN
Status: DISCONTINUED | OUTPATIENT
Start: 2022-09-12 | End: 2022-09-12 | Stop reason: ALTCHOICE

## 2022-09-12 RX ORDER — ONDANSETRON 2 MG/ML
4 INJECTION INTRAMUSCULAR; INTRAVENOUS EVERY 6 HOURS PRN
Status: DISCONTINUED | OUTPATIENT
Start: 2022-09-12 | End: 2022-09-13 | Stop reason: HOSPADM

## 2022-09-12 RX ORDER — SENNA PLUS 8.6 MG/1
1 TABLET ORAL 2 TIMES DAILY
Qty: 60 TABLET | Refills: 0 | Status: SHIPPED | OUTPATIENT
Start: 2022-09-12 | End: 2022-10-25

## 2022-09-12 RX ORDER — ONDANSETRON 2 MG/ML
INJECTION INTRAMUSCULAR; INTRAVENOUS PRN
Status: DISCONTINUED | OUTPATIENT
Start: 2022-09-12 | End: 2022-09-12 | Stop reason: SDUPTHER

## 2022-09-12 RX ORDER — BUPIVACAINE HYDROCHLORIDE 5 MG/ML
INJECTION, SOLUTION EPIDURAL; INTRACAUDAL PRN
Status: DISCONTINUED | OUTPATIENT
Start: 2022-09-12 | End: 2022-09-12 | Stop reason: SDUPTHER

## 2022-09-12 RX ORDER — ONDANSETRON 2 MG/ML
4 INJECTION INTRAMUSCULAR; INTRAVENOUS
Status: COMPLETED | OUTPATIENT
Start: 2022-09-12 | End: 2022-09-12

## 2022-09-12 RX ORDER — SODIUM CHLORIDE, SODIUM LACTATE, POTASSIUM CHLORIDE, CALCIUM CHLORIDE 600; 310; 30; 20 MG/100ML; MG/100ML; MG/100ML; MG/100ML
INJECTION, SOLUTION INTRAVENOUS CONTINUOUS
Status: DISCONTINUED | OUTPATIENT
Start: 2022-09-12 | End: 2022-09-12 | Stop reason: HOSPADM

## 2022-09-12 RX ORDER — MIDAZOLAM HYDROCHLORIDE 1 MG/ML
INJECTION INTRAMUSCULAR; INTRAVENOUS PRN
Status: DISCONTINUED | OUTPATIENT
Start: 2022-09-12 | End: 2022-09-12 | Stop reason: SDUPTHER

## 2022-09-12 RX ORDER — MIDAZOLAM HYDROCHLORIDE 1 MG/ML
INJECTION INTRAMUSCULAR; INTRAVENOUS
Status: COMPLETED
Start: 2022-09-12 | End: 2022-09-12

## 2022-09-12 RX ORDER — SODIUM CHLORIDE 0.9 % (FLUSH) 0.9 %
5-40 SYRINGE (ML) INJECTION PRN
Status: DISCONTINUED | OUTPATIENT
Start: 2022-09-12 | End: 2022-09-13 | Stop reason: HOSPADM

## 2022-09-12 RX ORDER — SODIUM CHLORIDE 9 MG/ML
INJECTION, SOLUTION INTRAVENOUS PRN
Status: DISCONTINUED | OUTPATIENT
Start: 2022-09-12 | End: 2022-09-13 | Stop reason: HOSPADM

## 2022-09-12 RX ORDER — KETAMINE HCL IN NACL, ISO-OSM 20 MG/2 ML
SYRINGE (ML) INJECTION PRN
Status: DISCONTINUED | OUTPATIENT
Start: 2022-09-12 | End: 2022-09-12 | Stop reason: SDUPTHER

## 2022-09-12 RX ORDER — SUCCINYLCHOLINE/SOD CL,ISO/PF 200MG/10ML
SYRINGE (ML) INTRAVENOUS PRN
Status: DISCONTINUED | OUTPATIENT
Start: 2022-09-12 | End: 2022-09-12 | Stop reason: SDUPTHER

## 2022-09-12 RX ORDER — DIPHENHYDRAMINE HYDROCHLORIDE 50 MG/ML
12.5 INJECTION INTRAMUSCULAR; INTRAVENOUS
Status: DISCONTINUED | OUTPATIENT
Start: 2022-09-12 | End: 2022-09-12 | Stop reason: HOSPADM

## 2022-09-12 RX ORDER — ACETAMINOPHEN 325 MG/1
650 TABLET ORAL EVERY 4 HOURS PRN
Status: DISCONTINUED | OUTPATIENT
Start: 2022-09-12 | End: 2022-09-13 | Stop reason: HOSPADM

## 2022-09-12 RX ADMIN — PHENYLEPHRINE HYDROCHLORIDE 200 MCG: 10 INJECTION INTRAVENOUS at 09:13

## 2022-09-12 RX ADMIN — CELECOXIB 400 MG: 200 CAPSULE ORAL at 07:02

## 2022-09-12 RX ADMIN — FENTANYL CITRATE 100 MCG: 50 INJECTION, SOLUTION INTRAMUSCULAR; INTRAVENOUS at 10:29

## 2022-09-12 RX ADMIN — PHENYLEPHRINE HYDROCHLORIDE 200 MCG: 10 INJECTION INTRAVENOUS at 08:54

## 2022-09-12 RX ADMIN — MIDAZOLAM HYDROCHLORIDE 2 MG: 2 INJECTION, SOLUTION INTRAMUSCULAR; INTRAVENOUS at 07:35

## 2022-09-12 RX ADMIN — MIDAZOLAM HYDROCHLORIDE 2 MG: 2 INJECTION, SOLUTION INTRAMUSCULAR; INTRAVENOUS at 08:00

## 2022-09-12 RX ADMIN — VANCOMYCIN HYDROCHLORIDE 1500 MG: 10 INJECTION, POWDER, LYOPHILIZED, FOR SOLUTION INTRAVENOUS at 07:49

## 2022-09-12 RX ADMIN — SODIUM CHLORIDE, SODIUM LACTATE, POTASSIUM CHLORIDE, AND CALCIUM CHLORIDE: .6; .31; .03; .02 INJECTION, SOLUTION INTRAVENOUS at 06:53

## 2022-09-12 RX ADMIN — PREGABALIN 150 MG: 150 CAPSULE ORAL at 07:02

## 2022-09-12 RX ADMIN — Medication 20 MG: at 08:05

## 2022-09-12 RX ADMIN — ACETAMINOPHEN 1000 MG: 500 TABLET ORAL at 07:02

## 2022-09-12 RX ADMIN — FENTANYL CITRATE 100 MCG: 50 INJECTION, SOLUTION INTRAMUSCULAR; INTRAVENOUS at 07:34

## 2022-09-12 RX ADMIN — CEFAZOLIN 2000 MG: 2 INJECTION, POWDER, FOR SOLUTION INTRAMUSCULAR; INTRAVENOUS at 20:13

## 2022-09-12 RX ADMIN — ROCURONIUM BROMIDE 50 MG: 10 INJECTION INTRAVENOUS at 08:14

## 2022-09-12 RX ADMIN — DEXAMETHASONE SODIUM PHOSPHATE 4 MG: 4 INJECTION, SOLUTION INTRAMUSCULAR; INTRAVENOUS at 08:15

## 2022-09-12 RX ADMIN — BUPIVACAINE HYDROCHLORIDE 30 ML: 5 INJECTION, SOLUTION EPIDURAL; INTRACAUDAL; PERINEURAL at 07:40

## 2022-09-12 RX ADMIN — ATORVASTATIN CALCIUM 20 MG: 20 TABLET, FILM COATED ORAL at 21:14

## 2022-09-12 RX ADMIN — ONDANSETRON 4 MG: 2 INJECTION INTRAMUSCULAR; INTRAVENOUS at 18:41

## 2022-09-12 RX ADMIN — SODIUM CHLORIDE, PRESERVATIVE FREE 10 ML: 5 INJECTION INTRAVENOUS at 21:14

## 2022-09-12 RX ADMIN — DOCUSATE SODIUM 50MG AND SENNOSIDES 8.6MG 1 TABLET: 8.6; 5 TABLET, FILM COATED ORAL at 20:13

## 2022-09-12 RX ADMIN — ONDANSETRON 4 MG: 2 INJECTION INTRAMUSCULAR; INTRAVENOUS at 13:43

## 2022-09-12 RX ADMIN — PHENYLEPHRINE HYDROCHLORIDE 100 MCG: 10 INJECTION INTRAVENOUS at 08:15

## 2022-09-12 RX ADMIN — ONDANSETRON 4 MG: 2 INJECTION INTRAMUSCULAR; INTRAVENOUS at 08:15

## 2022-09-12 RX ADMIN — ROCURONIUM BROMIDE 30 MG: 10 INJECTION INTRAVENOUS at 09:30

## 2022-09-12 RX ADMIN — PHENYLEPHRINE HYDROCHLORIDE 100 MCG: 10 INJECTION INTRAVENOUS at 08:10

## 2022-09-12 RX ADMIN — ASPIRIN 325 MG: 325 TABLET, COATED ORAL at 20:13

## 2022-09-12 RX ADMIN — SODIUM CHLORIDE, POTASSIUM CHLORIDE, SODIUM LACTATE AND CALCIUM CHLORIDE: 600; 310; 30; 20 INJECTION, SOLUTION INTRAVENOUS at 07:04

## 2022-09-12 RX ADMIN — PHENYLEPHRINE HYDROCHLORIDE 100 MCG: 10 INJECTION INTRAVENOUS at 08:35

## 2022-09-12 RX ADMIN — PHENYLEPHRINE HYDROCHLORIDE 100 MCG: 10 INJECTION INTRAVENOUS at 09:25

## 2022-09-12 RX ADMIN — Medication 140 MG: at 08:07

## 2022-09-12 RX ADMIN — CEFTRIAXONE 2000 MG: 2 INJECTION, POWDER, FOR SOLUTION INTRAMUSCULAR; INTRAVENOUS at 08:00

## 2022-09-12 RX ADMIN — GABAPENTIN 300 MG: 300 CAPSULE ORAL at 07:03

## 2022-09-12 RX ADMIN — LIDOCAINE HYDROCHLORIDE 100 MG: 20 INJECTION, SOLUTION INTRAVENOUS at 08:06

## 2022-09-12 RX ADMIN — SODIUM CHLORIDE, SODIUM LACTATE, POTASSIUM CHLORIDE, AND CALCIUM CHLORIDE: .6; .31; .03; .02 INJECTION, SOLUTION INTRAVENOUS at 09:26

## 2022-09-12 RX ADMIN — BUPIVACAINE 10 ML: 13.3 INJECTION, SUSPENSION, LIPOSOMAL INFILTRATION at 07:40

## 2022-09-12 RX ADMIN — PHENYLEPHRINE HYDROCHLORIDE 100 MCG: 10 INJECTION INTRAVENOUS at 09:19

## 2022-09-12 RX ADMIN — SUGAMMADEX 200 MG: 100 INJECTION, SOLUTION INTRAVENOUS at 10:28

## 2022-09-12 RX ADMIN — PHENYLEPHRINE HYDROCHLORIDE 100 MCG: 10 INJECTION INTRAVENOUS at 08:32

## 2022-09-12 ASSESSMENT — PAIN SCALES - GENERAL
PAINLEVEL_OUTOF10: 0

## 2022-09-12 ASSESSMENT — LIFESTYLE VARIABLES: SMOKING_STATUS: 0

## 2022-09-12 ASSESSMENT — PAIN - FUNCTIONAL ASSESSMENT: PAIN_FUNCTIONAL_ASSESSMENT: 0-10

## 2022-09-12 NOTE — PROGRESS NOTES
Occupational Therapy  Facility/Department: [address] [place] [address]  Occupational Therapy Initial Assessment and Treatment      Name: Kelsey Rodney  : 1955  MRN: [id number]  Date of Service: 2022    Discharge Recommendations:  Kelsey Rodney scored a 14/24 on the AM-PAC ADL Inpatient form. Current research shows that an AM-PAC score of 18 or greater is typically associated with a discharge to the patient's home setting. Based on the patient's AM-PAC score, and their current ADL deficits, it is recommended that the patient have 2-3 sessions per week of Occupational Therapy at d/c to increase the patient's independence. At this time, this patient demonstrates the endurance and safety to discharge home with (home services) and a follow up treatment frequency of 2-3x/wk. Please see assessment section for further patient specific details. If patient discharges prior to next session this note will serve as a discharge summary. Please see below for the latest assessment towards goals. OT Equipment Recommendations  Equipment Needed: No (has needed equipment in place)       Patient Diagnosis(es): The encounter diagnosis was Status post replacement of left shoulder joint. Past Medical History:  has a past medical history of Arthritis, Cancer ([facility] [address] and Hyperlipidemia. Past Surgical History:  has a past surgical history that includes joint replacement (Right, ); Appendectomy; hernia repair; Prostatectomy; and Total shoulder arthroplasty (Right, 2017). Treatment Diagnosis: impaired ADLs/transfers s/p L TSR      Assessment   Performance deficits / Impairments: Decreased functional mobility ; Decreased ADL status; Decreased balance  Assessment: Seen POD#0 s/p L TSR - seen in PACU. Pt drowsy, nauseated, and lightheaded. Able to get dressed w/ assist - unable to ambulate at this time 2* above mentioned. Pt is hopeful to discharge home today and reports his spouse can provide initial 24 hr A. Pt is familiar w/ postop process - s/p R TSR (2017). Anticipate pt will make functional progress w/ resolution of symptoms. Will follow up one more time this afternoon when pt is more awake. Discussed w/ PACU nurse. Treatment Diagnosis: impaired ADLs/transfers s/p L TSR  Decision Making: Medium Complexity  REQUIRES OT FOLLOW-UP: Yes  Activity Tolerance  Activity Tolerance: Treatment limited secondary to medical complications (free text) (nauseated and lightheaded)  Activity Tolerance Comments: BP readings as follows: supine (99/68), sitting EOB (118/83), after getting dressed (126/82). Pt did dryheave several times and was diaphoretic. Plan   Plan  Times per Week: 7  Times per Day: Daily  Current Treatment Recommendations: Balance training, Functional mobility training, Safety education & training, Self-Care / ADL     Restrictions  Restrictions/Precautions  Restrictions/Precautions: Weight Bearing  Upper Extremity Weight Bearing Restrictions  Left Upper Extremity Weight Bearing: Non Weight Bearing  Position Activity Restriction  Other position/activity restrictions: NWB to L arm, up with assist    Subjective   General  Chart Reviewed: Yes  Additional Pertinent Hx: 61 y.o. M to OR 9/12 for LEFT TOTAL SHOULDER REPLACEMENT,REMOVAL OF MULTIPLE LOOSE BODIES AND BICEPS TENODESIS. PMH: Prostate CA, Hyperlipidemia, R THR (2015), [place]y, Hernia Repair, R TSR (2017). Family / Caregiver Present: No  Referring Practitioner: Rishi Ortiz MD    Subjective  Subjective: Seen in PACU. C/o feeling nauseated. Expressing he is hopeful to discharge home today.     no c/o    Social/Functional History  Social/Functional History  Lives With: Spouse  Type of Home: House  Home Layout: Able to Live on Main level with bedroom/bathroom  Home Access: Stairs to enter without rails (2 FLORIAN)  Bathroom Shower/Tub: Walk-in shower  Bathroom Toilet: Handicap height  Bathroom Equipment: Built-in shower seat, Hand-held shower  Home Equipment: (none)  Has the patient had two or more falls in the past year or any fall with injury in the past year?: No  ADL Assistance: Independent  Homemaking Assistance:  (shares with spouse)  Ambulation Assistance: Independent  Transfer Assistance: Independent  Active : Yes  Occupation: Retired  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 Nauseated, diaphoretic, drowsy.)    Toilet Transfers  Toilet Transfers Comments: Note: unable to tolerate ambulation to bathroom    Shower Transfers  Shower Transfers Comments: Note: educated regarding recommendation to use shower chair - stated understanding    AROM: Grossly decreased, non-functional (L)  Strength: Grossly decreased, non-functional (L)  Coordination: Grossly decreased, non-functional (L)    ADL  UE Dressing: Maximum assistance (don/doff shoulder immobilizer, t-shirt)  UE Dressing Skilled Clinical Factors: Note: L-hand dominant and hand/UE numb. Shoulder immobilizer adjusted for proper fit (multile adjustments made). LE Dressing: Moderate assistance (assist to thread feet and pullup pants over L hip; unsteady in standing)          Bed mobility  Supine to Sit: Contact guard assistance  Sit to Supine: Stand by assistance  Bed Mobility Comments: Pt c/o feeling lightheaded sitting EOB.   /83    Transfers  Sit to stand: Minimal assistance  Stand to sit: Minimal assistance    Vision  Vision: Within Functional Limits  Hearing  Hearing: Within functional limits    Orientation  Overall Orientation Status: Within Functional Limits                    Education Given To:

## 2022-09-12 NOTE — DISCHARGE INSTRUCTIONS
Dr. Keiry Hobbs Discharge Instructions    Take pain medication as directed. If taking narcotic pain medication, do not take tylenol with this as there is tylenol in percocet/norco.   Non weight bearing to effected extremity  Maintain sling until follow up or therapy. OK to remove sling several times a day to move elbow and wrist, no shoulder motion unless directed by therapy or Dr. Eli Stoner. Therapy as instructed. You should receive a call regarding therapy  Resume regular diet  May shower after 72 hours. Avoid any submersion of operative extremity. Call immediately if any increasing redness or drainage, any fevers.   Sennokot for constipation  Zofran for nausea  Aspirin 325mg twice daily for next 14 days to decrease risk of blood clots    [facility] and [address] [place] [address]  Call [phone] for appointment

## 2022-09-12 NOTE — PROGRESS NOTES
Called into OR to see if he wants patient to see PT/OT before discharge.  Dr Aristeo wright PT/OT orders, will place when he is out of next case

## 2022-09-12 NOTE — PROGRESS NOTES
Pt unsteady on feet  and dizzy returned back to bed. Unable to pass PT/OT.  Dr. Isa Blackman called in [address]

## 2022-09-12 NOTE — BRIEF OP NOTE
Brief Postoperative Note      Patient: Lizbeth Bull  YOB: 1955  MRN: [id number]    Date of Procedure: 9/12/2022    Pre-Op Diagnosis: Chronic left shoulder pain [M25.512, G89.29]    Post-Op Diagnosis:  Left shoulder glenohumeral arthritis       Procedure(s):  LEFT TOTAL SHOULDER REPLACEMENT,REMOVAL OF MULTIPLE LOOSE BODIES AND BICEPS TENODESIS    Surgeon(s):  Fredy Montes MD    Assistant:  Surgical Assistant: Joan Alcazar  Fellow: Fan Kincaid MD    Anesthesia: General    Estimated Blood Loss (mL): 120    Complications: None    Specimens:   * No specimens in log *    Implants:  * No implants in log *      Drains: * No LDAs found *    Findings: See dictated operative report.      Electronically signed by Fan Kincaid MD on 9/12/2022 at 10:30 AM

## 2022-09-12 NOTE — PROGRESS NOTES
Admitted to pacu at this time. Sedated with OA in place. Shoulder block received preop with exparel with bracelet on.  Report given by [facility] ([facility] AT [place]

## 2022-09-12 NOTE — PROGRESS NOTES
Occupational Therapy  Facility/Department: [facility] GENERAL SURGERY  Daily Treatment Note  NAME: Td Rucker  : 1955  MRN: [id number]    Date of Service: 2022    Discharge Recommendations:  Td Rucker scored a 14/24 on the AM-PAC ADL Inpatient form. Current research shows that an AM-PAC score of 18 or greater is typically associated with a discharge to the patient's home setting. Based on the patient's AM-PAC score, and their current ADL deficits, it is recommended that the patient have 2-3 sessions per week of Occupational Therapy at d/c to increase the patient's independence. At this time, this patient demonstrates the endurance and safety to discharge home with (home services) and a follow up treatment frequency of 2-3x/wk. Please see assessment section for further patient specific details. If patient discharges prior to next session this note will serve as a discharge summary. Please see below for the latest assessment towards goals. OT Equipment Recommendations  Equipment Needed: No (has needed equipment in place)      Patient Diagnosis(es): The encounter diagnosis was Status post replacement of left shoulder joint. Assessment    Attempted to work with pt for a second time. Pt expressing he is feeling better. Able to sit at EOB w/ SBA. Stood w/ CGA. Pt very unsteady requiring Min A hand held assist - w/ LOB requiring Mod A for recovery. Pt expressing he does not feel ready to discharge home in his current state - discussed w/ nurse and charge nurse. Anticipate pt will make functional progress once he is more awake, alert, and feeling better. Equipment Needed: No (has needed equipment in place)      Plan   Plan  Times per Week: 7  Times per Day: Daily  Current Treatment Recommendations: Balance training;Functional mobility training; Safety education & training;Self-Care / ADL     Restrictions  Restrictions/Precautions  Restrictions/Precautions: Weight Bearing  Upper Extremity Weight Bearing Restrictions  Left Upper Extremity Weight Bearing: Non Weight Bearing  Position Activity Restriction  Other position/activity restrictions: NWB to L arm, up with assist    Subjective   Orientation  Overall Orientation Status: Within Functional Limits    Pain: no c/o             Objective      Supine to sit: SBA  EOB sitting. SBA. Transfers:   Sit to stand: CGA  Stand to sit: CGA    Attempted mobility: ~4-6' forward - requiring Min A HHA. LOB requiring Mod A for recovery - unsteady. Safety Devices  Type of Devices:  (left on stretcher in PACU, nurse at bedside)       Patient Education  Education Given To: Patient  Education Provided: Role of Therapy;Plan of Care;Precautions; ADL Adaptive Strategies;Transfer Training  Education Method: Verbal  Barriers to Learning:  (nausea and drowsiness)  Education Outcome: Continued education needed;Verbalized understanding    Goals  Short Term Goals  Time Frame for Short term goals: Discharge  Short Term Goal 1: tolerate toilet transfer assessment  Short Term Goal 2: LB dressing w/ Min A  Short Term Goal 3: don/doff shoulder immobilizer w/ mod A  Patient Goals   Patient goals : to return home       Therapy Time   Individual Concurrent Group Co-treatment   Time In 1515         Time Out 1527         Minutes 12                 Darling Monroy OTR/L #[id number]

## 2022-09-12 NOTE — ANESTHESIA PROCEDURE NOTES
Peripheral Block    Patient location during procedure: procedure area  Reason for block: post-op pain management and at surgeon's request  Start time: 9/12/2022 7:37 AM  End time: 9/12/2022 7:40 AM  Staffing  Performed: anesthesiologist   Anesthesiologist: Letha Hanson DO  Preanesthetic Checklist  Completed: patient identified, IV checked, site marked, risks and benefits discussed, surgical/procedural consents, equipment checked, pre-op evaluation, timeout performed, anesthesia consent given, oxygen available and monitors applied/VS acknowledged  Peripheral Block   Patient position: supine  Prep: ChloraPrep  Provider prep: sterile gloves and mask  Patient monitoring: cardiac monitor, continuous pulse ox, continuous capnometry, frequent blood pressure checks, IV access, oxygen and responsive to questions  Block type: Brachial plexus  Interscalene  Laterality: left  Injection technique: single-shot  Guidance: ultrasound guided  Local infiltration: bupivacaine  Local infiltration: bupivacaine    Needle   Needle type: insulated echogenic nerve stimulator needle   Needle gauge: 20 G  Needle localization: anatomical landmarks and ultrasound guidance  Needle insertion depth: 2.2 cm  Test dose: negative  Assessment   Injection assessment: negative aspiration for heme, no paresthesia on injection, local visualized surrounding nerve on ultrasound and no intravascular symptoms  Paresthesia pain: none  Slow fractionated injection: yes  Hemodynamics: stable  Real-time US image taken/store: yes  Outcomes: patient tolerated procedure well    Additional Notes  Left Interscalene Brachial Plexus Block    Indication: Postoperative analgesia upon request of the attending surgeon. Procedure: Patient supine. Landmarks identified. Sterile prep and drape.   20 G 40mm-insulated regional block needle inserted perpendicular to the skin in the groove between the anterior and middle scalene muscles at the C6 : Bupivacaine 0.5% 30 cc and 10 cc of exparel was injected in 5cc increments to 40 cc total.  Negative aspiration for heme and CSF prior to each injection. Block tolerated well. No apparent complications.

## 2022-09-12 NOTE — OP NOTE
[facility]               [address]                                OPERATIVE REPORT    PATIENT NAME: Babar Guaman                   :        1955  MED REC NO:   [id number]                          ROOM:  ACCOUNT NO:   [de-identified]                           ADMIT DATE: 2022  PROVIDER:     Arielle George MD    DATE OF PROCEDURE:  2022    PREOPERATIVE DIAGNOSIS:   End-stage glenohumeral osteoarthritis, left  shoulder. POSTOPERATIVE DIAGNOSES:  End-stage glenohumeral osteoarthritis, left  shoulder, with biceps tendinopathy; multiple intraarticular loose bodies  including one large loose body in the bicipital groove. PROCEDURES:  Left shoulder examination under anesthesia, arthrotomy,  removal of multiple intraarticular loose bodies, open biceps tenodesis  and anatomic total shoulder replacement. Modifier 22 applied because of the extra time extended carefully  dissecting out the multiple large intraarticular loose bodies including  one loose body that had migrated down to bicipital groove. Extensive  tenosynovitis, extensive scarring. In addition, the shoulder was very  tight, large peripheral osteophytes and deformities which complicated  all aspects of procedure. This roughly added an aggregate of 40% extra  effort and work to an otherwise uncomplicated procedure. ANESTHESIA:  General anesthesia, interscalene block. IV FLUIDS:  Per anesthesia records. ESTIMATED BLOOD LOSS:  400 mL. COMPLICATIONS:  None. SURGEON:  Arielle George MD    ASSISTANT:  Brenda Rodgers MD  he availability of a skilled first assistant such as Dr. Nely Dominguez was  critically important to execute this complex surgery. The fellow had to  assist with retractor placement during exposure and especially during  glenoid exposure and assist with suture management during subscapularis   repair.  He helped facilitate all aspects of the further surgery. He wished to proceed  and gave informed consent. He was scheduled on an elective basis after  appropriate preoperative medical clearance. DESCRIPTION OF PROCEDURE:  On the date of the procedure, brought back to  the operating room, placed supine on the operating table. General  anesthesia was established. Preoperative antibiotics and interscalene  block were given in the holding area. Under anesthesia, exam was  carried out with findings as noted above. The patient was positioned  using Tenet beach-chair positioner in the semi-sitting position. Trunk  was elevated to 45 degrees. All pressure points were padded. The  patient's shoulder and arm were prepped and draped in a standard manner  for shoulder replacement surgery. We used [place] to cover the operative  field. We used a Tenet Spider for arm position. All members of the  surgical team wore body exhaust suits. We approached the shoulder through a 12-cm oblique incision from the  coracoid coursing down towards the level of the deltoid tubercle. The  incision was carried out with a skin knife through skin and subcutaneous  tissue. Meticulous dissection was carried out. Gelpi retractor was  placed. Deltopectoral interval was identified and developed with a  combination of blunt and sharp dissection. The cephalic vein was  retracted laterally. It did start to bleed at one point and was  cauterized. We released the clavipectoral fascia. Portion was quite  thickened and this was excised. We palpated the large loose body  underneath the upper rolled border of the pectoralis, so we excised the  bicipital groove and the upper border of pectoralis. We evacuated large  osteophytes along with quite a bit of tenosynovitis. We then tenodesed  the biceps to the upper rolled border of pectoralis tendon using two #2  Ethibond figure-of-eight stitches. Tendon proximally was excised. We  did open up to the rotator interval for this. We then took down  subscapularis with cautery sharply off the lesser tuberosity. We  cauterized the anterior cervical vessels and released the inferior  capsule and exposed some very large osteophytes. We stripped the  capsule with key elevator. We dislocated the humeral head. We used a  half-inch curved osteotomy and rongeur to remove the osteophytes from  anterior to posteromedial.  We then placed our cutting guide in  appropriate height and retroversion. We pinned the guide in place. We  used an oscillating saw to resect the humeral head. A generous cut was  taken. This allowed us to expose additional osteophytes posterior and  these were removed. We prepared the humerus. We reamed up to size 16  broach, up to size 12 and at this point, seated the broach just a little  bit further, so we could use the calcar reamer, reamed a couple  millimeters of bone that was still prominent right up to the level of  the rotator cuff insertion. Additional osteophytes were removed and  then we broached up to 14 and then a 16 broach. We trialed the 54 x 20  ostial humeral head and seemed to fit the best.  We removed the trial  head, left the broach in place. We turned our attention towards the  glenoid. We excised the anterior capsule, excised the rotator interval.   Additional osteophytes came in to view from the subscapularis recess and  these removed. We tacked the subscapularis. We then exposed the  glenoid. We resected the labrum and the biceps stump circumferentially. We did a release with cautery. We used a long-handle 15 blade to remove  some of the osteophyte labrums inferiorly and posteriorly. A couple of  loose bodies adherent to the anterior glenoid were also resected. We  had a 3D-printed guide valve, but did not seem to fit well, so we just  used the surrogate as a guide and drilled our central line. This  corrected the version just a little bit.   We reamed over the guidewire  with a 54 reamer, reaming concentrically predominantly anteriorly and  just a little bit posteriorly. Once we had good concentric reaming, we  placed a guide for our peripheral peg holes. This was a cannulated  guide. We impacted in place and then drilled for the peripheral peg  holes. We removed the trial guide. We used a 4.8 drill for the center  hole for line-to-line fill. We removed the guidewire. We irrigated  copiously. We brought in the 54 trial and it fit really well. There  was no toggle. At this point, we ready the cement. We irrigated  copiously with pulsatile lavage. We dried the peg holes and then  inserted cement with a Arabella syringe in all four peg holes. We removed  the excess cement with a key elevator. We brought in 54 peg implant,  impacted in place, held it under axial loading during curing process. Excess cement was removed. We then placed a 54 x 20 offset humeral head  on the 16 broach and trial.  Subscapularis mobilized nicely with great  spring back and good external rotation. At this point, we selected the  humeral components and removed all the trial implants. We irrigated  copiously. We used a wire-passing drill bit and drilled 9 drill holes,  centered from the osteotomy site starting just behind the bicipital  groove. Placed #2 Ethibond sutures through these holes for  subscapularis repair. We brought in our implant and impacted in place. Just prior to final seating, we did place our neck, impacted in place  and dialed a 54 x 20 offset humeral head in maximal offset precision and  impacted into place. We reduced the shoulder one final time, checked  for mobility and stability one final time. We passed the sutures from  the osteotomy site to the subscapularis. These were passed from top to  bottom and tied in a manner, getting the arm to about 45 degrees of  external rotation by the time we tied the inferior sutures.   From that  position, we did side-to-side stitch, which was our 10 suture with #2  Ethibond to close the distal rotator interval.  Rest of the rotator  interval was left patent. We irrigated copiously and closed the wound  in layers with 1 gm of vancomycin powder. We used 0 Vicryl in a  figure-of-eight fashion to close the deltopectoral interval.  2-0 Vicryl  in interrupted inverted fashion to close the subcutaneous tissue. Routine skin closure with 4-0 Monocryl and Prineo dressing to close the  skin. Sterile compression dressing was applied. The arm was placed in  a padded soft brace. The patient was repositioned in the supine  position before this, promptly awakened from anesthesia, having  tolerated the procedure well, and was taken from the operating room to  the recovery room in satisfactory condition. PLAN:  The patient will be given the option of discharge home on oral  analgesics with instructions to begin outpatient physical therapy  including use of a CPM chair. Follow up in the office in one week.         Joey Pollock MD    D: 09/12/2022 10:37:02       T: 09/12/2022 14:06:04     [email or web address]  Job#: [id number]     Doc#: [id number]    CC:

## 2022-09-12 NOTE — H&P
Gabriel Yamileth    [id number]    [facility] Same Day Surgery Update H & P  Department of General Surgery   Surgical Service   Pre-operative History and Physical  Last H & P within the last 30 days. DIAGNOSIS:   Chronic left shoulder pain [M25.512, G89.29]    Procedure(s):  LEFT TOTAL SHOULDER REPLACEMENT     History obtained from: Patient interview and EHR     HISTORY OF PRESENT ILLNESS:   The patient is a 61 y.o. male with c/o left shoulder pain, weakness and limited ROM in the setting of glenohumeral arthrosis. Their symptoms have been recalcitrant to conservative treatment and the patient presents today for the above procedure. Illness Screening: Patient denies fever, chills, worsening cough, or close contact with sick individuals. Past Medical History:        Diagnosis Date    Arthritis     Cancer [facility]) 2013    prostate    Hyperlipidemia      Past Surgical History:        Procedure Laterality Date    APPENDECTOMY      HERNIA REPAIR      JOINT REPLACEMENT Right 2015    hip    PROSTATECTOMY      SHOULDER ARTHROPLASTY Right 05/05/2017    RIGHT TOTAL SHOULDER ARTHROPLASTY, OPEN BICEPS TENODESIS,       Medications Prior to Admission:      Prior to Admission medications    Medication Sig Start Date End Date Taking?  Authorizing Provider   aspirin 81 MG chewable tablet Take 81 mg by mouth daily   Yes Historical Provider, MD   atorvastatin (LIPITOR) 20 MG tablet Take 20 mg by mouth daily  6/14/16   Historical Provider, MD         Allergies:  Ciprofloxacin    PHYSICAL EXAM:      BP (!) 148/92   Pulse 69   Temp 96.8 °F (36 °C) (Temporal)   Resp 16   Ht 6' 1\" (1.854 m)   Wt 208 lb 0.6 oz (94.4 kg)   SpO2 96%   BMI 27.45 kg/m²      Airway:  Airway patent with no audible stridor    Heart:  Regular rate and rhythm, No murmur noted    Lungs:  No increased work of breathing, good air exchange, clear to auscultation bilaterally, no crackles or wheezing    Abdomen:  Soft, non-distended, non-tender, no masses palpated    ASSESSMENT AND PLAN    Patient is a 61 y.o. male with above specified procedure planned. 1.  The patients history and physical was obtained and signed off by the pre-admission testing department. Patient seen and focused exam done today- no new changes since last physical exam on 8/25/22    2. Access to ancillary services are available per request of the provider.     CARLA Betancur - CNP     9/12/2022

## 2022-09-12 NOTE — PROGRESS NOTES
Physical Therapy  Facility/Department: [address] [place] [address] GENERAL SURGERY  Physical Therapy Initial Assessment and Treatment    Name: Abraham Coyne  : 1955  MRN: [id number]  Date of Service: 2022    Discharge Recommendations:    Abraham Coyne scored a 16/24 on the AM-PAC short mobility form. Current research shows that an AM-PAC score of 18 or greater is typically associated with a discharge to the patient's home setting. Based on the patient's AM-PAC score and their current functional mobility deficits, it is recommended that the patient have 2-3 sessions per week of Physical Therapy at d/c to increase the patient's independence. At this time, this patient demonstrates the endurance and safety to discharge home with home vs OP services and a follow up treatment frequency of 2-3x/wk. Please see assessment section for further patient specific details. If patient discharges prior to next session this note will serve as a discharge summary. Please see below for the latest assessment towards goals. PT Equipment Recommendations  Equipment Needed:  (anticipate no needs)      Patient Diagnosis(es): The encounter diagnosis was Status post replacement of left shoulder joint. Past Medical History:  has a past medical history of Arthritis, Cancer ([facility] [address] and Hyperlipidemia. Past Surgical History:  has a past surgical history that includes joint replacement (Right, ); Appendectomy; hernia repair; Prostatectomy; and Total shoulder arthroplasty (Right, 2017). Assessment   Body Structures, Functions, Activity Limitations Requiring Skilled Therapeutic Intervention: Decreased functional mobility ; Decreased endurance;Decreased balance  Assessment: Pt with decreased independent mobility from baseline s/p LEFT TOTAL SHOULDER REPLACEMENT,REMOVAL OF MULTIPLE LOOSE BODIES AND BICEPS TENODESIS. Pt reports normally independent with mobility without AD. Pt currently lethargic, lightheaded and c/o nausea. Needing min to mod assist for transfers/gt short distance. At risk for falls and not safe to get up alone at this time. Will likely progress well once feeling better. Rec cont skilled PT to maximize mobility and independence. If pt goes home, rec 24 hr direct physical assist at this time. Treatment Diagnosis: impaired functional mobility s/p LEFT TOTAL SHOULDER REPLACEMENT,REMOVAL OF MULTIPLE LOOSE BODIES AND BICEPS TENODESIS  Decision Making: Medium Complexity  Requires PT Follow-Up: Yes     Plan   Plan  Plan: 1 time a day 7 days a week  Current Treatment Recommendations: Balance training, Functional mobility training, Gait training, Stair training, Equipment evaluation, education, & procurement, Patient/Caregiver education & training, Safety education & training  Safety Devices  Type of Devices:  (left on stretcher in PACU, RN present)     Restrictions  Restrictions/Precautions  Restrictions/Precautions: Weight Bearing  Upper Extremity Weight Bearing Restrictions  Left Upper Extremity Weight Bearing: Non Weight Bearing  Position Activity Restriction  Other position/activity restrictions: NWB to L arm, up with assist     Subjective   General  Chart Reviewed: Yes  Additional Pertinent Hx: Pt is a 61 y.o. male with chronic L shoulder pain. Pt s/p LEFT TOTAL SHOULDER REPLACEMENT,REMOVAL OF MULTIPLE LOOSE BODIES AND BICEPS TENODESIS on 9/12. PMH: Hyperlipidemia, hernia repair, prostatectomy  Referring Practitioner: Rishi Ortiz MD  Referral Date : 09/12/22  Subjective  Subjective: Pt found supine on stretcher in PACU. Pt lethargic but agreeable to PT. Denies pain.          Social/Functional History  Social/Functional History  Lives With: Spouse  Type of Home: House  Home Layout: Able to Live on Main level with bedroom/bathroom  Home Access: Stairs to enter without rails (2 FLORIAN)  Bathroom Shower/Tub: Walk-in shower  Bathroom Toilet: Handicap height  Bathroom Equipment: Built-in shower seat, Hand-held shower  Home Equipment:  (none)  Has the patient had two or more falls in the past year or any fall with injury in the past year?: No  ADL Assistance: Independent  Homemaking Assistance:  (shares with spouse)  Ambulation Assistance: Independent  Transfer Assistance: Independent  Active : Yes  Occupation: Retired  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 /83  Transfers  Sit to Stand: Minimal Assistance  Stand to sit: Minimal Assistance  Ambulation  Device: No Device  Assistance: Moderate assistance  Quality of Gait: unsteady, decreased bilat step length/height, frequent cues to keep eyes open  Distance: 8'  Comments: Pt c/o feeling lightheaded and nauseated throughout session. Needs frequent cues to keep eyes open - lethargic         Treatment included: bed mobility, transfers, gt, pt education      Addendum:  Pt seen second time at 3:28pm.  Pt found sitting EOB with OT. Transferred sit to stand CGA. Static standing balance with CG/min assist 2/2 sway. Pt amb 10' without AD with CG/min assist.  Pt with LOB requiring min to mod assist to recover. Pt returned to sitting EOB CGA. Sit to sup SBA. Pt left supine on stretcher with RN.       OutComes Score                                                  AM-PAC Score  AM-PAC Inpatient Mobility Raw Score : 16 (09/12/22 3426)  AM-PAC Inpatient T-Scale Score : 40.78 (09/12/22 1424)  Mobility Inpatient CMS 0-100% Score: 54.16 (09/12/22 1424)  Mobility Inpatient CMS G-Code Modifier : CK (09/12/22 1424)          Tinneti Score       Goals  Short Term Goals  Time Frame for Short term goals: By discharge  Short term goal 1: Sup to sit modified independent  Short term goal 2: Pt will transfer sit to stand supervision  Short term goal 3: Pt will amb >80' with or without AD supervision  Short term goal 4: Pt will up/down 2 steps with LRAD SBA       Education  Patient Education  Education Given To: Patient  Education Provided: Role of Therapy;Plan of Care;IADL Safety;Precautions  Education Method: Verbal  Education Outcome: Verbalized understanding;Continued education needed      Therapy Time   Individual Concurrent Group Co-treatment   Time In 1319         Time Out 1357         Minutes 38               Timed Code Treatment Minutes: 23      Total Treatment Minutes:  [address], PT

## 2022-09-12 NOTE — ANESTHESIA PRE PROCEDURE
Department of Anesthesiology  Preprocedure Note       Name:  Casper Correa   Age:  61 y.o.  :  1955                                          MRN:  [id number]         Date:  2022      Surgeon: Damion Matamoros):  Leida Aguilar MD    Procedure: Procedure(s):  LEFT TOTAL SHOULDER REPLACEMENT    Medications prior to admission:   Prior to Admission medications    Medication Sig Start Date End Date Taking? Authorizing Provider   aspirin 81 MG chewable tablet Take 81 mg by mouth daily   Yes Historical Provider, MD   atorvastatin (LIPITOR) 20 MG tablet Take 20 mg by mouth daily  16   Historical Provider, MD       Current medications:    Current Facility-Administered Medications   Medication Dose Route Frequency Provider Last Rate Last Admin    lactated ringers infusion   IntraVENous Continuous Lina Vinson  mL/hr at 22 0704 New Bag at 22 0704    sodium chloride flush 0.9 % injection 5-40 mL  5-40 mL IntraVENous 2 times per day Lina Vinson MD        sodium chloride flush 0.9 % injection 5-40 mL  5-40 mL IntraVENous PRN Lina Vinson MD        0.9 % sodium chloride infusion   IntraVENous PRN Lina Vinson MD        cefTRIAXone (ROCEPHIN) 2,000 mg in dextrose 5 % 50 mL IVPB mini-bag  2,000 mg IntraVENous Once Leida Aguilar MD        vancomycin (VANCOCIN) 1,500 mg in dextrose 5 % 250 mL IVPB  15 mg/kg IntraVENous Once Leida Aguilar MD         Facility-Administered Medications Ordered in Other Encounters   Medication Dose Route Frequency Provider Last Rate Last Admin    lactated ringers infusion   IntraVENous Continuous PRN CARLA Mott - CRNA   New Bag at 22 8029       Allergies:     Allergies   Allergen Reactions    Ciprofloxacin Other (See Comments)     cramping       Problem List:    Patient Active Problem List   Diagnosis Code    S/P shoulder joint replacement Z96.619    S/P shoulder replacement Z96.619    Right shoulder pain M25.511    Age-related nuclear cataract, bilateral H25.13    Allergic rhinitis J30.9    Atherosclerosis of arteries I70.8    Elevated blood pressure reading without diagnosis of hypertension R03.0    Foreign body of left conjunctival sac T15. 12XA    Gastroesophageal reflux disease without esophagitis K21.9    History of appendectomy Z90.49    Hyperlipidemia E78.5    Malignant neoplasm of prostate ([facility] [address] C61    Nuclear senile cataract H25.10    Osteoarthritis of right shoulder M19.011       Past Medical History:        Diagnosis Date    Arthritis     [facility]    prostate    Hyperlipidemia        Past Surgical History:        Procedure Laterality Date    APPENDECTOMY      HERNIA REPAIR      JOINT REPLACEMENT Right 2015    hip    PROSTATECTOMY      SHOULDER ARTHROPLASTY Right 05/05/2017    RIGHT TOTAL SHOULDER ARTHROPLASTY, OPEN BICEPS TENODESIS,       Social History:    Social History     Tobacco Use    Smoking status: Never    Smokeless tobacco: Never   Substance Use Topics    Alcohol use: No     Alcohol/week: 0.0 standard drinks                                Counseling given: Not Answered      Vital Signs (Current):   Vitals:    09/02/22 1401 09/12/22 0542   BP:  (!) 148/92   Pulse:  69   Resp:  16   Temp:  96.8 °F (36 °C)   TempSrc:  Temporal   SpO2:  96%   Weight: 208 lb (94.3 kg) 208 lb 0.6 oz (94.4 kg)   Height: 6' 1\" (1.854 m)                                               BP Readings from Last 3 Encounters:   09/12/22 (!) 148/92   09/06/17 127/83   07/26/17 125/81       NPO Status: Time of last liquid consumption: 2100                        Time of last solid consumption: 2100                        Date of last liquid consumption: 09/11/22                        Date of last solid food consumption: 09/11/22    BMI:   Wt Readings from Last 3 Encounters:   09/12/22 208 lb 0.6 oz (94.4 kg)   08/31/22 208 lb (94.3 kg)   06/22/22 208 lb (94.3 kg)     Body mass index is 27.45 kg/m².     CBC:   Lab Results   Component Endo/Other ROS                    Abdominal:             Vascular: negative vascular ROS. Other Findings:           Anesthesia Plan      general and regional     ASA 2     (Left exparel brachial plexus block )  Induction: intravenous. MIPS: Prophylactic antiemetics administered. Anesthetic plan and risks discussed with spouse and patient. Plan discussed with CRNA.     Attending anesthesiologist reviewed and agrees with Preprocedure content                Anna Langston DO   9/12/2022

## 2022-09-12 NOTE — PROGRESS NOTES
PACU Transfer Note    Vitals:    09/12/22 1804   BP: (!) 135/93   Pulse:    Resp:    Temp:    SpO2:        In: 2625 [P.O.:450;  I.V.:2175]  Out: 400     Pain assessment:  none  Pain Level: 0    Report given to Receiving unit RN.    9/12/2022 6:47 PM

## 2022-09-13 VITALS
TEMPERATURE: 98 F | HEART RATE: 74 BPM | RESPIRATION RATE: 16 BRPM | DIASTOLIC BLOOD PRESSURE: 60 MMHG | OXYGEN SATURATION: 94 % | WEIGHT: 208 LBS | BODY MASS INDEX: 27.57 KG/M2 | SYSTOLIC BLOOD PRESSURE: 98 MMHG | HEIGHT: 73 IN

## 2022-09-13 LAB
HCT VFR BLD CALC: 38.7 % (ref 40.5–52.5)
HEMOGLOBIN: 12.9 G/DL (ref 13.5–17.5)
URINE CULTURE, ROUTINE: NORMAL

## 2022-09-13 PROCEDURE — 2580000003 HC RX 258: Performed by: ORTHOPAEDIC SURGERY

## 2022-09-13 PROCEDURE — 6370000000 HC RX 637 (ALT 250 FOR IP): Performed by: ORTHOPAEDIC SURGERY

## 2022-09-13 PROCEDURE — 97110 THERAPEUTIC EXERCISES: CPT

## 2022-09-13 PROCEDURE — 36415 COLL VENOUS BLD VENIPUNCTURE: CPT

## 2022-09-13 PROCEDURE — G0378 HOSPITAL OBSERVATION PER HR: HCPCS

## 2022-09-13 PROCEDURE — 6360000002 HC RX W HCPCS: Performed by: ORTHOPAEDIC SURGERY

## 2022-09-13 PROCEDURE — 85018 HEMOGLOBIN: CPT

## 2022-09-13 PROCEDURE — 97116 GAIT TRAINING THERAPY: CPT

## 2022-09-13 PROCEDURE — 85014 HEMATOCRIT: CPT

## 2022-09-13 PROCEDURE — 97530 THERAPEUTIC ACTIVITIES: CPT

## 2022-09-13 RX ADMIN — ASPIRIN 325 MG: 325 TABLET, COATED ORAL at 07:53

## 2022-09-13 RX ADMIN — DOCUSATE SODIUM 50MG AND SENNOSIDES 8.6MG 1 TABLET: 8.6; 5 TABLET, FILM COATED ORAL at 07:53

## 2022-09-13 RX ADMIN — CEFAZOLIN 2000 MG: 2 INJECTION, POWDER, FOR SOLUTION INTRAMUSCULAR; INTRAVENOUS at 03:22

## 2022-09-13 RX ADMIN — SODIUM CHLORIDE, PRESERVATIVE FREE 10 ML: 5 INJECTION INTRAVENOUS at 07:53

## 2022-09-13 ASSESSMENT — PAIN SCALES - GENERAL
PAINLEVEL_OUTOF10: 0
PAINLEVEL_OUTOF10: 0

## 2022-09-13 NOTE — PLAN OF CARE
Problem: Discharge Planning  Goal: Discharge to home or other facility with appropriate resources  Outcome: Progressing     Problem: Pain  Goal: Verbalizes/displays adequate comfort level or baseline comfort level  9/13/2022 1223 by Rigo Howard RN  Outcome: Progressing

## 2022-09-13 NOTE — PROGRESS NOTES
Physical Therapy  Facility/Department: [facility] 5T ORTHO/NEURO  Daily Treatment Note  NAME: Kelsey Rodney  : 1955  MRN: [id number]    Date of Service: 2022    Discharge Recommendations:  Kelsey Rodney scored a 22/24 on the AM-PAC short mobility form. Current research shows that an AM-PAC score of 18 or greater is typically associated with a discharge to the patient's home setting. Based on the patient's AM-PAC score and their current functional mobility deficits, it is recommended that the patient have 2-3 sessions per week of Physical Therapy at d/c to increase the patient's independence. At this time, this patient demonstrates the endurance and safety to discharge home with home vs OP services and a follow up treatment frequency of 2-3x/wk. Please see assessment section for further patient specific details. Patient Diagnosis(es): The encounter diagnosis was Status post replacement of left shoulder joint. Assessment   Assessment: Pt tolerated session well without pain, nausea or LOB. Transfers and gait training were steady and safe without sequencing cues. Managed the stairs safely without cues or LOB. Required education on sling fit and precautions. Plan    Plan  Plan: 1 time a day 7 days a week  Current Treatment Recommendations: Balance training;Functional mobility training;Gait training;Stair training;Equipment evaluation, education, & procurement;Patient/Caregiver education & training; Safety education & training     Restrictions  Restrictions/Precautions  Restrictions/Precautions: Weight Bearing  Upper Extremity Weight Bearing Restrictions  Left Upper Extremity Weight Bearing: Non Weight Bearing  Position Activity Restriction  Other position/activity restrictions: NWB to L arm, up with assist     Subjective    Subjective  Subjective: Pt was sitting up willing to participate  Pain: denies  Orientation  Overall Orientation Status: Within Functional Limits     Objective Balance  Standing:  (good)    Transfer Training  Transfer Training: Yes  Sit to Stand: Independent  Stand to Sit: Independent      Gait  Overall Level of Assistance: Independent  Gait Abnormalities: quick rossy with a reciprocal pattern and proper heel strike. Distance (ft): 50ft + 200 Feet    Stairs  Rail Use: None  Level of Assistance: Independent  Number of Stairs Trained: 2     PT Exercises  LUE: x15 elbow, wrist and finger flex/ext   Sling re fit with family training: x10 mins      Safety Devices  Type of Devices: Call light within reach; Chair alarm in place; Left in chair;Gait belt     Goals  Short Term Goals  Time Frame for Short term goals: By discharge  Short term goal 1: Sup to sit modified independent  Short term goal 2: Pt will transfer sit to stand supervision  Short term goal 3: Pt will amb >80' with or without AD supervision  Short term goal 4: Pt will up/down 2 steps with LRAD SBA    Education  Patient Education  Education Given To: Patient  Education Provided: Role of Therapy;Plan of Care;IADL Safety;Precautions  Education Method: Verbal  Education Outcome: Verbalized understanding;Continued education needed    Therapy Time   Individual Concurrent Group Co-treatment   Time In 0900         Time Out 0953         Minutes [address] [facility]     MET goals  [address] [facility] [address]

## 2022-09-13 NOTE — CARE COORDINATION
Patient has no needs at discharge. Will return home with spouse at discharge and spouse will be there for needs. Independent PTA.  Electronically signed by Viktoria Doty RN on 9/13/2022 at 10:12 AM

## 2022-09-13 NOTE — PROGRESS NOTES
Pt up to bathroom with standby assist, pt explains significant improvement in nausea while ambulating compared to previous attempts. L shoulder immobilizer/sling intact,  nerve block still in effect, pain ball intact. Pt tolerating PO without emesis. Call light within reach, fall and safety precautions maintained.

## 2022-09-13 NOTE — CONSULTS
Hospitalist Consultation Note    Patient's PCP: Dr. Tasha Betancourt MD     Requesting Physician: Dr. Marleni Lucia MD    Reason for Consultation: Medical management    HISTORY OF PRESENT ILLNESS:    This is a very pleasant 61 y.o. male with a history of multiple medical problems presenting for left shoulder pain s/p L total replacement consulted for medical management post-op. Has had surgeries before and did well afterwards, but today he felt lightheaded and not quite right after the surgery, so was kept overnight. Currently, he is feeling better after getting up to the bathroom and walking. No acute complaints other than mild pain. Past Medical / Surgical History:    Past Medical History:   Diagnosis Date    Arthritis     Cancer ([facility] [address] 2013    prostate    Hyperlipidemia        Past Surgical History:   Procedure Laterality Date    APPENDECTOMY      HERNIA REPAIR      JOINT REPLACEMENT Right 2015    hip    PROSTATECTOMY      SHOULDER ARTHROPLASTY Right 05/05/2017    RIGHT TOTAL SHOULDER ARTHROPLASTY, OPEN BICEPS TENODESIS,       Medications Prior to Admission:    No current facility-administered medications on file prior to encounter. Current Outpatient Medications on File Prior to Encounter   Medication Sig Dispense Refill    atorvastatin (LIPITOR) 20 MG tablet Take 20 mg by mouth daily          Allergies:  Ciprofloxacin    Social History:   TOBACCO:   reports that he has never smoked. He has never used smokeless tobacco.     ETOH:   reports no history of alcohol use. Patient currently lives with family     Family History:   History reviewed. No pertinent family history. ROS: Review of Systems - Negative except pain. All other systems reviewed and are negative. PHYSICAL EXAM:  BP (!) 135/93   Pulse 63   Temp 97.9 °F (36.6 °C) (Temporal)   Resp 16   Ht 6' 1\" (1.854 m)   Wt 208 lb 0.6 oz (94.4 kg)   SpO2 97%   BMI 27.45 kg/m²     No results for input(s): POCGLU in the last 72 hours.     General appearance: No apparent distress, appears stated age and cooperative. HEENT: Pupils equal, round, and reactive to light. Conjunctivae/corneas clear. Neck: Supple, with full range of motion. No jugular venous distention. Trachea midline. Respiratory:  Normal respiratory effort. Clear to auscultation, bilaterally without Rales/Wheezes/Rhonchi. Cardiovascular: Regular rate and rhythm with normal S1/S2 without murmurs, rubs or gallops. Abdomen: Soft, non-tender, non-distended with normal bowel sounds. Musculoskeletal: No clubbing, cyanosis or edema bilaterally. Left arm in sling. Skin: Skin color, texture, turgor normal.  No rashes or lesions. Neurologic:  Neurovascularly intact without any focal sensory/motor deficits. Cranial nerves: II-XII intact, grossly non-focal.  Psychiatric: Alert and oriented, thought content appropriate, normal insight    LABS:  No results for input(s): WBC, HGB, HCT, PLT in the last 72 hours. No results for input(s): NA, K, CL, CO2, BUN, CREATININE, GLUCOSE in the last 72 hours. Invalid input(s):  CA,  PHOS  No results for input(s): AST, ALT, ALB, BILITOT, ALKPHOS in the last 72 hours. No results for input(s): TROPONINI in the last 72 hours. No results for input(s): BNP in the last 72 hours. No results for input(s): CHOL, HDL in the last 72 hours. Invalid input(s): LDLCALCU  Recent Labs     09/12/22  0650   INR 0.94     Recent Labs     09/12/22  0630   COLORU Yellow   PHUR 6.0   CLARITYU Clear   SPECGRAV >=1.030   LEUKOCYTESUR Negative   UROBILINOGEN 0.2   BILIRUBINUR Negative   BLOODU Negative   GLUCOSEU Negative        EKG Reviewed:     Assessment & Plan: This is a very pleasant 61 y.o. male with a history of multiple medical problems presenting for Total left shoulder replacement consulted for medical management post-op.     Left total shoulder replacement  - POD0  - Doing well post-op  - Pain mgmt per ortho    HLD  - Cont Lipitor 20 mg po qd  - Cont  BID per ortho    Prostate cancer s/p prostatectomy - 2013  - Stable, takes no meds  - Watch UOP    Thank you Dr. Beryle Hau, MD for the opportunity to be involved in this patients care. If you have any questions or concerns please feel free to contact me via Perfect [employer].     Emily Gonzalez MD

## 2022-09-13 NOTE — PROGRESS NOTES
S: KERRY overnight, pain well controlled and block still working. Eating, drinking minimally, will try breakfast this morning. Voiding spontaneously. O:   Gen: NAD  CV: RR  Resp: nonlabored   Msk: LUE: dressing c/d/i, 2+ radial pulse, block still in place with minimal sensation and motor to hand. A&P: 60yo M POD#1 L TSA. Admitted to Dr. Oneill on board   Xray: L shoulder TSA in good position, no fractures.    Pain as ordered, block still working very well   ADAT    BID DVT Ppx  Voiding well   PT/OT continue to treat   Doxycycline x 5 days   Senokot bowel regimen   Zofran as needed for nausea    Dispo: discharge home today     Tucker Jimenez

## 2022-09-13 NOTE — PROGRESS NOTES
Called lab to check on status of H&H draw. Was told that it was drawn but are waiting on the results. Family was concerned over these results as they were told that PT \"lost a lot of blood\" during surgery.

## 2022-09-13 NOTE — PLAN OF CARE
Problem: Pain  Goal: Verbalizes/displays adequate comfort level or baseline comfort level  Outcome: Progressing  Flowsheets (Taken 9/13/2022 0035)  Verbalizes/displays adequate comfort level or baseline comfort level:   Encourage patient to monitor pain and request assistance   Assess pain using appropriate pain scale     Problem: Safety - Adult  Goal: Free from fall injury  Outcome: Progressing  Note: No falls occurred this shift; fall precautions maintained.

## 2022-09-14 ENCOUNTER — TELEPHONE (OUTPATIENT)
Dept: ORTHOPEDIC SURGERY | Age: 67
End: 2022-09-14

## 2022-09-14 NOTE — TELEPHONE ENCOUNTER
Spoke with patient    Incision status: No drainage or redness    Edema/Swelling/Teds: No reports of swelling    Pain level and status: Patient states he has had no pain. His block is mostly worn off. Use of pain medications: He has not taken Percocet yet. He took 2 Tylenol before he went to bed last night and is currently having no pain. Blood thinner: Aspirin 325 mg BID    Bowels: Patient had a bowel movement this morning. He is taking Senokot. Home Care Agency active: No    Outpatient therapy: He is starting therapy 09/16/22. Do you have all of your medications: Yes    Changes in medications: No    Instructed pt to call Nurse Navigator or surgeon's office with any questions or concerns.      Follow up appointments:    Future Appointments   Date Time Provider Brian Huertas   9/16/2022 10:00 AM HARMONY Armas PT Karen WRIGHT   9/21/2022 11:00 AM Luciano Wells MD [facility]

## 2022-09-16 ENCOUNTER — TELEPHONE (OUTPATIENT)
Dept: ORTHOPEDIC SURGERY | Age: 67
End: 2022-09-16

## 2022-09-16 ENCOUNTER — HOSPITAL ENCOUNTER (OUTPATIENT)
Dept: PHYSICAL THERAPY | Age: 67
Setting detail: THERAPIES SERIES
Discharge: HOME | End: 2022-09-16
Payer: COMMERCIAL

## 2022-09-16 PROCEDURE — 97140 MANUAL THERAPY 1/> REGIONS: CPT | Performed by: PHYSICAL THERAPIST

## 2022-09-16 PROCEDURE — 97110 THERAPEUTIC EXERCISES: CPT | Performed by: PHYSICAL THERAPIST

## 2022-09-16 PROCEDURE — 97161 PT EVAL LOW COMPLEX 20 MIN: CPT | Performed by: PHYSICAL THERAPIST

## 2022-09-16 NOTE — TELEPHONE ENCOUNTER
PATIENT'S SPOUSE STOP IN, HAS NOT HEARD FROM  REGARDING THE CHAIR.   SHE WOULD LIKE TO KNOW IF YOU COULD GET BACK IN TOUCH WITH HER TO FOLLOW UP.

## 2022-09-19 NOTE — PLAN OF CARE
Night Pain:    - complained of night pain associated with sleep position. Type:      - Intermittent      Paresthesia complaints:   - no paresthesia complaints       Functional Limitations/Impairments: []  - Lifting  - reaching   - Grooming   - Carrying      - ADL's   - Driving  - Sports/Recreation activity      Occupation/School:   Retired     Living Status/Prior Level of Function: This patient was independent in ADL's and IADL's prior to onset of symptoms. PACEMAKER:  - Denied having a pacemaker that would contraindicate the use of electrical modalities. METAL IMPLANTS:  - Denied metal implants that would contraindicate the use of thermal modalities. CANCER HISTORY:  - Denied a history of cancer that would contraindicate thermal modalities. OBJECTIVE:     ROM:   CERVICAL    SHOULDER: 30 degrees passive flexion     Joint Mobility: n/a     Strength/Neuromuscular control:  n/a     Dermatomal Sensation:  - Dermatomal sensation was intact to light touch bilaterally throughout upper and lower extremities. Deep tendon reflexes:  - DTR's were symmetrical and intact throughout. Palpation:    Functional Mobility/Transfers:     Posture: + fwd head/kyphosis       Bandages/Dressings/Incisions:     Gait:     Orthopedic Special Tests:                           [x] Patient history, allergies, meds reviewed. Medical chart reviewed. See intake form. Review Of Systems (ROS):  [x]Performed Review of systems (Integumentary, CardioPulmonary, Neurological) by intake and observation. Intake form has been scanned into medical record. Patient has been instructed to contact their primary care physician regarding ROS issues if not already being addressed at this time. Objective Review of Systems:  Cognitive, Communication, Behavior and Learning:  - The patient was alert, spoke coherently and was oriented to person, place and time.   - Demonstrated no barriers to communication or processing information.  - Appeared literate, spoke [place] and had no significant hearing or vision impairment. - Had no abnormal behavioral responses, learning preferences or educational needs. ASSESSMENT:    Functional Impairments   [x]Noted spinal or UE joint hypomobility   [x]Noted spinal or UE joint hypermobility   [x]Decreased UE functional ROM   [x]Decreased UE functional strength   [x]Abnormal reflexes/sensation/myotomal/dermatomal deficits   [x]Decreased RC/scapular/core strength and neuromuscular control   []other:      Functional Activity Limitations (from functional questionnaire and intake)   [x]Reduced ability to tolerate prolonged functional positions   [x]Reduced ability or difficulty with changes of positions or transfers between positions   [x]Reduced ability to maintain good posture and demonstrate good body mechanics with sitting, bending, and lifting   [x] Reduced ability or tolerance with driving and/or computer work   [x]Reduced ability to sleep   [x]Reduced ability to perform lifting, reaching, carrying tasks   [x]Reduced ability to tolerate impact through UE   [x]Reduced ability to reach behind back   [x]Reduced ability to  or hold objects   [x]Reduced ability to throw or toss an object    Participation Restrictions   [x]Reduced participation in self care activities   [x]Reduced participation in home management activities   [x]Reduced participation in work activities   [x]Reduced participation in social activities. [x]Reduced participation in sport activities. Classification :  signs/symptoms consistent with post-surgical status including decreased ROM, strength and function. - impaired joint mobility, motor function, muscle performance and range of motion associated with:  - joint arthroplasty. (Pattern 4H)  - bony or soft tissue surgical procedure. (Pattern 4I)  - ligament or other connective tissue dysfunction. (Pattern 4D)  - localized inflammation.  (Pattern 4E)  -  Prognosis/Rehab Potential:       - Good     Factors affecting rehab potential:  - associated co-morbidities      Tolerance of evaluation/treatment:     - Good     Physical Therapy Evaluation Complexity Justification  [x] A history of present problem with:  [x] no personal factors and/or comorbidities that impact the plan of care;  []1-2 personal factors and/or comorbidities that impact the plan of care  []3 personal factors and/or comorbidities that impact the plan of care  [x] An examination of body systems using standardized tests and measures addressing any of the following: body structures and functions (impairments), activity limitations, and/or participation restrictions;:  [x] a total of 1-2 or more elements   [] a total of 3 or more elements   [] a total of 4 or more elements   [x] A clinical presentation with:  [] stable and/or uncomplicated characteristics   [x] evolving clinical presentation with changing characteristics  [] unstable and unpredictable characteristics;   [x] Clinical decision making of [x] low, [] moderate, [] high complexity using standardized patient assessment instrument and/or measurable assessment of functional outcome.     [x] EVAL (LOW) 51969 (typically 30 minutes face-to-face)  [] EVAL (MOD) 07638 (typically 30 minutes face-to-face)  [] EVAL (HIGH) 46041 (typically 45 minutes face-to-face)  [] RE-EVAL         Co-morbidities/Complexities (which will affect course of rehabilitation):   []None           Arthritic conditions   []Rheumatoid arthritis (M05.9)  [x]Osteoarthritis (M19.91)   Cardiovascular conditions   [x]Hypertension (I10)  []Hyperlipidemia (E78.5)  []Angina pectoris (I20)  []Atherosclerosis (I70)   Musculoskeletal conditions   [x]Disc pathology   []Congenital spine pathologies   []Prior surgical intervention  []Osteoporosis (M81.8)  []Osteopenia (M85.8)   Endocrine conditions   []Hypothyroid (E03.9)  []Hyperthyroid Gastrointestinal conditions   []Constipation (I32.27)   Metabolic conditions   []Morbid obesity (E66.01)  []Diabetes type 1(E10.65) or 2 (E11.65)   []Neuropathy (G60.9)     Pulmonary conditions   []Asthma (J45)  []Coughing   []COPD (J44.9)   Psychological Disorders  []Anxiety (F41.9)  []Depression (F32.9)   []Other:   []Other:            PLAN:  Frequency/Duration:  2 days per week for 12 Weeks:  INTERVENTIONS:  1. Therapeutic exercise including: strength training, ROM, NMR and proprioception for the scapula, core and Upper extremity  2. Manual therapy as indicated including Dry Needling/IASTM, STM, PROM, Gr I-IV mobilizations, spinal mobilization/manipulation. 3. Modalities as needed including: thermal agents, E-stim, US, iontophoresis as indicated. 4. Patient education on joint protection, activity modification, progression of HEP. HEP instruction: fav 4     GOALS:  Patient stated goal: \"to get close to 100%\"    Therapist goals for Patient:   Short Term Goals: To be achieved in: 2 weeks  - Independent in HEP and progression per patient tolerance, in order to prevent re-injury. -  Patient will have a decrease in pain to facilitate improvement in movement, function, and ADLs as indicated by Functional Deficits. Long Term Goals: To be achieved in: 12 weeks  - The patient is expected to demonstrate less than 25% impairment, limitation or restriction in:  - carrying, moving and handling objects. - Patient will demonstrate increased passive ROM to a deficiency of only 15% to allow for proper joint functioning as indicated by patients Functional Deficits. - Patient will demonstrate an increase in Strength to 4-/5 to allow for proper functional mobility as indicated by patients Functional Deficits. - Patient will return to desired, higher level upper extremity functional activities without increased symptoms or restriction.       Electronically signed by:  Armida Covarrubias, PT, MPT

## 2022-09-19 NOTE — FLOWSHEET NOTE
[facility] and [address],  Sports Performance and Rehabilitation, [facility] [address] [place] [address]  [address] [facility],5[address]   Carlo Perez  Phone: [phone]  Fax: [phone]       Physical Therapy Treatment Note/ Progress Report:           Date:  2022    Patient Name:  Azul Alcocer    :  1955  MRN: [id number]  Restrictions/Precautions:    Medical/Treatment Diagnosis Information:     Left shoulder pain - m25.512      Insurance/Certification information:     Physician Information:     Has the plan of care been signed (Y/N):        []  Yes  [x]  No     Date of Patient follow up with Physician:       Is this a Progress Report:     []  Yes  [x]  No        If Yes:  Date Range for reporting period:  Beginning  Ending    Progress report will be due (10 Rx or 30 days whichever is less):        Recertification will be due (POC Duration  / 90 days whichever is less):          Visit # Insurance Allowable Auth Required   1  []  Yes []  No        Functional Scale: \    Date assessed:        Latex Allergy:  [x]NO      []YES  Preferred Language for Healthcare:   [x]English       []other:      Pain level:  /10     SUBJECTIVE:  See eval    OBJECTIVE: See eval  Observation:   Test measurements:      RESTRICTIONS/PRECAUTIONS:     Exercises/Interventions:       Therapeutic Ex (21145) Sets/sec Reps Notes/CUES   Er to 0   5 x 15\"    Passive flex to 60  5 x 15\"    Scap retr's   30x                                         Education   10'                 Manual Intervention (94863)      Prom/stm   18'                                                                                                                                             Therapeutic Exercise and NMR EXR  [x] (47843) Provided verbal/tactile cueing for activities related to strengthening, flexibility, endurance, ROM for improvements in LE, proximal hip, and core control with self care, mobility, lifting, ambulation. [x] (41672) Provided verbal/tactile cueing for activities related to improving balance, coordination, kinesthetic sense, posture, motor skill, proprioception to assist with LE, proximal hip, and core control in self-care, mobility, lifting, ambulation and eccentric single leg control. NMR and Therapeutic Activities:    [x] (77016 or 78931) Provided verbal/tactile cueing for activities related to improving balance, coordination, kinesthetic sense, posture, motor skill, proprioception and motor activation to allow for proper function of core, proximal hip and LE with self-care and ADLs and functional mobility.   [] (22159) Gait Re-education- Provided training and instruction to the patient for proper LE, core and proximal hip recruitment and positioning and eccentric body weight control with ambulation re-education including up and down stairs     Home Exercise Program:    [x] (33113) Reviewed/Progressed HEP activities related to strengthening, flexibility, endurance, ROM of core, proximal hip and LE for functional self-care, mobility, lifting and ambulation/stair navigation   [] (13849) Reviewed/Progressed HEP activities related to improving balance, coordination, kinesthetic sense, posture, motor skill, proprioception of core, proximal hip and LE for self-care, mobility, lifting, and ambulation/stair navigation      Manual Treatments:  PROM / STM / Oscillations-Mobs:  G-I, II, III, IV (PA's, Inf., Post.)  [x] (41544) Provided manual therapy to mobilize LE, proximal hip and/or LS spine soft tissue/joints for the purpose of modulating pain, promoting relaxation, increasing ROM, reducing/eliminating soft tissue swelling/inflammation/restriction, improving soft tissue extensibility and allowing for proper ROM for normal function with self-care, mobility, lifting and ambulation. Modalities:     [] GAME READY (VASO)- for significant edema, swelling, pain control.      Charges:  Timed Code Treatment Minutes: 28'    Total Treatment Minutes: 72'       [x] EVAL (LOW) 15585 (typically 20 minutes face-to-face)  [] EVAL (MOD) 79620 (typically 30 minutes face-to-face)  [] EVAL (HIGH) 56354 (typically 45 minutes face-to-face)  [] RE-EVAL     [x] RH(85986) x   1  [] IONTO   NMR (98244) x     [] VASO  [][x] Manual (80062) x    1 [] Other:  [] TA x      [] Mech Traction (85124)  [] ES(attended) (79865)      [] ES (un) (54578):         ASSESSMENT:  See eval      GOALS:   Patient stated goal:     [] Progressing: [] Met: [] Not Met: [] Adjusted    Therapist goals for Patient:   Short Term Goals: To be achieved in: 2 weeks  1. Independent in HEP and progression per patient tolerance, in order to prevent re-injury. [] Progressing: [] Met: [] Not Met: [] Adjusted   2. Patient will have a decrease in pain to facilitate improvement in movement, function, and ADLs as indicated by Functional Deficits. [] Progressing: [] Met: [] Not Met: [] Adjusted    Long Term Goals: To be achieved in:    12 weeks  - The patient is expected to demonstrate less than 25% impairment, limitation or restriction in:  - carrying, moving and handling objects. - Patient will demonstrate increased passive ROM to a deficiency of only 15% to allow for proper joint functioning as indicated by patients Functional Deficits. [] Progressing: [] Met: [] Not Met: [] Adjusted  - Patient will demonstrate an increase in Strength to 4-/5 to allow for proper functional mobility as indicated by patients Functional Deficits. [] Progressing: [] Met: [] Not Met: [] Adjusted  - Patient will return to desired, higher level upper extremity functional activities without increased symptoms or restriction. [] Progressing: [] Met: [] Not Met: [] Adjusted              Overall Progression Towards Functional goals/ Treatment Progress Update:  [] Patient is progressing as expected towards functional goals listed.     [] Progression is slowed due to complexities/Impairments listed. [] Progression has been slowed due to co-morbidities. [x] Plan just implemented, too soon to assess goals progression <30days   [] Goals require adjustment due to lack of progress  [] Patient is not progressing as expected and requires additional follow up with physician  [] Other    Prognosis for POC: [x] Good [] Fair  [] Poor      Patient requires continued skilled intervention: [x] Yes  [] No    Treatment/Activity Tolerance:  [x] Patient able to complete treatment  [] Patient limited by fatigue  [] Patient limited by pain    [] Patient limited by other medical complications  [] Other:         Return to Play: (if applicable)   []  Stage 1: Intro to Strength   []  Stage 2: Return to Run and Strength   []  Stage 3: Return to Jump and Strength   []  Stage 4: Dynamic Strength and Agility   []  Stage 5: Sport Specific Training     []  Ready to Return to Play, Meets All Above Stages   []  Not Ready for Return to Sports   Comments:                               PLAN: See eval  [] Continue per plan of care [] Alter current plan (see comments above)  [x] Plan of care initiated [] Hold pending MD visit [] Discharge      Electronically signed by:  Vega Gordillo, PT, MPT     Note: If patient does not return for scheduled/ recommended follow up visits, this note will serve as a discharge from care along with most recent update on progress.

## 2022-09-21 ENCOUNTER — OFFICE VISIT (OUTPATIENT)
Dept: ORTHOPEDIC SURGERY | Age: 67
End: 2022-09-21

## 2022-09-21 ENCOUNTER — HOSPITAL ENCOUNTER (OUTPATIENT)
Dept: PHYSICAL THERAPY | Age: 67
Setting detail: THERAPIES SERIES
Discharge: HOME | End: 2022-09-21
Payer: COMMERCIAL

## 2022-09-21 VITALS — BODY MASS INDEX: 27.57 KG/M2 | WEIGHT: 208 LBS | HEIGHT: 73 IN

## 2022-09-21 DIAGNOSIS — Z96.612 STATUS POST TOTAL REPLACEMENT OF LEFT SHOULDER: Primary | ICD-10-CM

## 2022-09-21 DIAGNOSIS — M25.512 LEFT SHOULDER PAIN, UNSPECIFIED CHRONICITY: ICD-10-CM

## 2022-09-21 PROCEDURE — 97140 MANUAL THERAPY 1/> REGIONS: CPT | Performed by: PHYSICAL THERAPIST

## 2022-09-21 PROCEDURE — 99024 POSTOP FOLLOW-UP VISIT: CPT | Performed by: ORTHOPAEDIC SURGERY

## 2022-09-21 RX ORDER — ATORVASTATIN CALCIUM 40 MG/1
TABLET, FILM COATED ORAL
COMMUNITY
Start: 2022-09-06

## 2022-09-21 NOTE — PROGRESS NOTES
History of Present Illness:  Mallory Edwards is a 61 y.o. male who presents for a post operative visit. The patient underwent a left anatomic total shoulder arthroplasty and biceps tenodesis on 9/12/2022 by Dr. Kristina Carrillo. Overall he is doing okay and feels that their pain is well controlled and is off of his pain medication. He did stay at the hospital overnight for one day. He has been compliant with wearing the UltraSling brace at all times. He has a CPM machine which he is using. He is working with [employer] at [place]. The patient denies any fevers, chills, numbness, tingling, and shortness of breath. Medical History:  Patient's medications, allergies, past medical, surgical, social and family histories were reviewed and updated as appropriate. No notes on file    Review of Systems  A 14 point review of systems was completed by the patient is available in the media section of the scanned medical record and was reviewed on 9/21/2022. Vital Signs: There were no vitals filed for this visit. General/Appearance: Alert and oriented and in no apparent distress. Skin:  There are no skin lesions, cellulitis, or extreme edema. The patient has warm and well-perfused Bilateral upper extremities with brisk capillary refill. left Shoulder Exam:    Inspection: left shoulder incision that is clean, dry and intact and well approximated. The Prineo dressing is still in place. Mild ecchymosis and swelling are present as can be expected. There is no erythema, drainage or other signs of infection    Palpation:  No crepitus to gentle motion    Active Range of Motion: Deferred    Passive Range of Motion:  patient tolerates pendulum movement    Strength:  Deferred    Special Tests:  Deferred.     Neurovascular: Sensation to light touch is intact, no motor deficits, palpable radial pulses 2+    Radiology:     Plain radiographs of the left shoulder comprising 2 views: AP and axillary lateral were obtained and reviewed in the office: Shows postsurgical changes from the total shoulder replacement. All the components are in good placement without any signs of loosening, fractures, subluxations or dislocations. Impression: Stable postop x-ray. Assessment :  Mr. Raymond Aceves is a 61 y.o. patient who underwent left anatomic total shoulder arthoplasty on 9/12/2022. He took no pain pills postoperatively and is doing great. He will need to be careful not to overdo it early in his postoperative recovery. Impression:  Encounter Diagnoses   Name Primary? Left shoulder pain, unspecified chronicity Yes    Status post total replacement of left shoulder        Office Procedures:  Orders Placed This Encounter   Procedures    XR SHOULDER LEFT (MIN 2 VIEWS)     Standing Status:   Future     Number of Occurrences:   1     Standing Expiration Date:   9/15/2023     Order Specific Question:   Reason for exam:     Answer:   left shoulder pain    [facility] Physical Therapy - [place]     Referral Priority:   Routine     Referral Type:   Eval and Treat     Referral Reason:   Specialty Services Required     Requested Specialty:   Physical Therapist     Number of Visits Requested:   1       Treatment Plan:    Overall, Raymond Aceves is doing very well. He his little to no postoperative pain. We recommend that he wear the UltraSling brace at all times with the exception of clothing, bathing of physical therapy. The patient was told that he is restricted from driving for at least 3 weeks postop. We will give him a print out of the physical therapy order. All of his questions were fully answered today. We would like to see him back in 2 weeks for follow-up visit. 11:45 AM      Jamie Law PA-C  Orthopaedic Sports Medicine Physician Assistant    During this examination, Jamie LAWSON PA-C, functioned as a scribe for Dr. Riki Koch.      This dictation was performed with a verbal recognition program (DRAGON) and it was checked for errors. It is possible that there are still dictated errors within this office note. If so, please bring any errors to my attention for an addendum. All efforts were made to ensure that this office note is accurate.  ____________________  I, Dr. Beryle Hau, personally performed the services described in this documentation as described by Shilpi Stinson PA-C in my presence, and it is both accurate and complete. Amada Greenberg MD, PhD  9/21/2022

## 2022-09-25 NOTE — FLOWSHEET NOTE
[facility] and [address],  Sports Performance and Rehabilitation, [facility] [address]  [address] [facility],5[address] Floor   Carlo Perez  Phone: [phone]  Fax: [phone]       Physical Therapy Treatment Note/ Progress Report:           Date:  2022    Patient Name:  Diana Boyer    :  1955  MRN: [id number]  Restrictions/Precautions:    Medical/Treatment Diagnosis Information:     Left shoulder pain - m25.512      Insurance/Certification information:     Physician Information:     Has the plan of care been signed (Y/N):        []  Yes  [x]  No     Date of Patient follow up with Physician:       Is this a Progress Report:     []  Yes  [x]  No        If Yes:  Date Range for reporting period:  Beginning  Ending    Progress report will be due (10 Rx or 30 days whichever is less):        Recertification will be due (POC Duration  / 90 days whichever is less):          Visit # Insurance Allowable Auth Required   2  []  Yes []  No        Functional Scale: \    Date assessed:        Latex Allergy:  [x]NO      []YES  Preferred Language for Healthcare:   [x]English       []other:      Pain level:  /10     SUBJECTIVE:  See eval    OBJECTIVE: See eval  Observation:   Test measurements:      RESTRICTIONS/PRECAUTIONS:     Exercises/Interventions:       Therapeutic Ex (03186) Sets/sec Reps Notes/CUES   Er to 0      Passive flex to 60     Scap retr's                                    Education                  Manual Intervention (92779)      Prom/stm   15'                                                                                                                                            Therapeutic Exercise and NMR EXR  [x] (79072) Provided verbal/tactile cueing for activities related to strengthening, flexibility, endurance, ROM for improvements in LE, proximal hip, and core control with self care, mobility, lifting, ambulation.   [x] (89496) Provided verbal/tactile cueing for activities related to improving balance, coordination, kinesthetic sense, posture, motor skill, proprioception to assist with LE, proximal hip, and core control in self-care, mobility, lifting, ambulation and eccentric single leg control. NMR and Therapeutic Activities:    [x] (28366 or 12706) Provided verbal/tactile cueing for activities related to improving balance, coordination, kinesthetic sense, posture, motor skill, proprioception and motor activation to allow for proper function of core, proximal hip and LE with self-care and ADLs and functional mobility.   [] (67980) Gait Re-education- Provided training and instruction to the patient for proper LE, core and proximal hip recruitment and positioning and eccentric body weight control with ambulation re-education including up and down stairs     Home Exercise Program:    [x] (22813) Reviewed/Progressed HEP activities related to strengthening, flexibility, endurance, ROM of core, proximal hip and LE for functional self-care, mobility, lifting and ambulation/stair navigation   [] (00929) Reviewed/Progressed HEP activities related to improving balance, coordination, kinesthetic sense, posture, motor skill, proprioception of core, proximal hip and LE for self-care, mobility, lifting, and ambulation/stair navigation      Manual Treatments:  PROM / STM / Oscillations-Mobs:  G-I, II, III, IV (PA's, Inf., Post.)  [x] (33499) Provided manual therapy to mobilize LE, proximal hip and/or LS spine soft tissue/joints for the purpose of modulating pain, promoting relaxation, increasing ROM, reducing/eliminating soft tissue swelling/inflammation/restriction, improving soft tissue extensibility and allowing for proper ROM for normal function with self-care, mobility, lifting and ambulation. Modalities:     [] GAME READY (VASO)- for significant edema, swelling, pain control.      Charges:  Timed Code Treatment Minutes: 28'    Total Treatment Minutes: 28'      [] EVAL (LOW) 42748 (typically 20 minutes face-to-face)  [] EVAL (MOD) 24767 (typically 30 minutes face-to-face)  [] EVAL (HIGH) 42120 (typically 45 minutes face-to-face)  [] RE-EVAL     [] JW(18808)   [] IONTO   NMR (33087) x     [] VASO  [][x] Manual (97393) x    1 [] Other:  [] TA x      [] Mech Traction (41034)  [] ES(attended) (21638)      [] ES (un) (20893):         ASSESSMENT:  See eval      GOALS:   Patient stated goal:     [x] Progressing: [] Met: [] Not Met: [] Adjusted    Therapist goals for Patient:   Short Term Goals: To be achieved in: 2 weeks  1. Independent in HEP and progression per patient tolerance, in order to prevent re-injury. [x] Progressing: [] Met: [] Not Met: [] Adjusted   2. Patient will have a decrease in pain to facilitate improvement in movement, function, and ADLs as indicated by Functional Deficits. [x] Progressing: [] Met: [] Not Met: [] Adjusted    Long Term Goals: To be achieved in:    12 weeks  - The patient is expected to demonstrate less than 25% impairment, limitation or restriction in:  - carrying, moving and handling objects. - Patient will demonstrate increased passive ROM to a deficiency of only 15% to allow for proper joint functioning as indicated by patients Functional Deficits. [x] Progressing: [] Met: [] Not Met: [] Adjusted  - Patient will demonstrate an increase in Strength to 4-/5 to allow for proper functional mobility as indicated by patients Functional Deficits. [x] Progressing: [] Met: [] Not Met: [] Adjusted  - Patient will return to desired, higher level upper extremity functional activities without increased symptoms or restriction. [x] Progressing: [] Met: [] Not Met: [] Adjusted              Overall Progression Towards Functional goals/ Treatment Progress Update:  [] Patient is progressing as expected towards functional goals listed. [] Progression is slowed due to complexities/Impairments listed.   [] Progression has been slowed due to co-morbidities. [x] Plan just implemented, too soon to assess goals progression <30days   [] Goals require adjustment due to lack of progress  [] Patient is not progressing as expected and requires additional follow up with physician  [] Other    Prognosis for POC: [x] Good [] Fair  [] Poor      Patient requires continued skilled intervention: [x] Yes  [] No    Treatment/Activity Tolerance:  [x] Patient able to complete treatment  [] Patient limited by fatigue  [] Patient limited by pain    [] Patient limited by other medical complications  [] Other:         Return to Play: (if applicable)   []  Stage 1: Intro to Strength   []  Stage 2: Return to Run and Strength   []  Stage 3: Return to Jump and Strength   []  Stage 4: Dynamic Strength and Agility   []  Stage 5: Sport Specific Training     []  Ready to Return to Play, Meets All Above Stages   []  Not Ready for Return to Sports   Comments:                               PLAN: See eval  [x] Continue per plan of care [] Alter current plan (see comments above)  [] Plan of care initiated [] Hold pending MD visit [] Discharge      Electronically signed by:  Anthony Uribe, PT, MPT     Note: If patient does not return for scheduled/ recommended follow up visits, this note will serve as a discharge from care along with most recent update on progress.

## 2022-09-27 ENCOUNTER — HOSPITAL ENCOUNTER (OUTPATIENT)
Dept: PHYSICAL THERAPY | Age: 67
Setting detail: THERAPIES SERIES
Discharge: HOME | End: 2022-09-27
Payer: COMMERCIAL

## 2022-09-27 PROCEDURE — 97140 MANUAL THERAPY 1/> REGIONS: CPT | Performed by: PHYSICAL THERAPIST

## 2022-09-27 PROCEDURE — 97110 THERAPEUTIC EXERCISES: CPT | Performed by: PHYSICAL THERAPIST

## 2022-09-28 ENCOUNTER — TELEPHONE (OUTPATIENT)
Dept: ORTHOPEDIC SURGERY | Age: 67
End: 2022-09-28

## 2022-09-29 NOTE — FLOWSHEET NOTE
[facility] and [address],  Sports Performance and Rehabilitation, [facility] [address]  793 [facility],5Th Floor   Carlo Perez  Phone: [phone]  Fax: [phone]       Physical Therapy Treatment Note/ Progress Report:           Date:  2022    Patient Name:  Lizbeth Bull    :  1955  MRN: [id number]  Restrictions/Precautions:    Medical/Treatment Diagnosis Information:     Left shoulder pain - m25.512      Insurance/Certification information:     Physician Information:     Has the plan of care been signed (Y/N):        []  Yes  [x]  No     Date of Patient follow up with Physician:       Is this a Progress Report:     []  Yes  [x]  No        If Yes:  Date Range for reporting period:  Beginning  Ending    Progress report will be due (10 Rx or 30 days whichever is less):        Recertification will be due (POC Duration  / 90 days whichever is less):          Visit # Insurance Allowable Auth Required   3  []  Yes []  No        Functional Scale: \    Date assessed:        Latex Allergy:  [x]NO      []YES  Preferred Language for Healthcare:   [x]English       []other:      Pain level:  /10     SUBJECTIVE:  \"I am feeling fine. .feeling good\"    OBJECTIVE:  meeting goals. Not forcing any rom.    Observation:   Test measurements:      RESTRICTIONS/PRECAUTIONS:     Exercises/Interventions:       Therapeutic Ex (46033) Sets/sec Reps Notes/CUES   Er to 0   5 x 15\"    Passive flex to 60  5 x 15\"    Scap retr's   30x     L/ld bicep stretch   5.5'                                               Manual Intervention (05874)      Prom/stm   12'                                                                                                                                            Therapeutic Exercise and NMR EXR  [x] (29563) Provided verbal/tactile cueing for activities related to strengthening, flexibility, endurance, ROM for improvements in LE, proximal hip, and core control with self care, mobility, lifting, ambulation. [x] (09036) Provided verbal/tactile cueing for activities related to improving balance, coordination, kinesthetic sense, posture, motor skill, proprioception to assist with LE, proximal hip, and core control in self-care, mobility, lifting, ambulation and eccentric single leg control. NMR and Therapeutic Activities:    [x] (99732 or 46008) Provided verbal/tactile cueing for activities related to improving balance, coordination, kinesthetic sense, posture, motor skill, proprioception and motor activation to allow for proper function of core, proximal hip and LE with self-care and ADLs and functional mobility.   [] (77936) Gait Re-education- Provided training and instruction to the patient for proper LE, core and proximal hip recruitment and positioning and eccentric body weight control with ambulation re-education including up and down stairs     Home Exercise Program:    [x] (52653) Reviewed/Progressed HEP activities related to strengthening, flexibility, endurance, ROM of core, proximal hip and LE for functional self-care, mobility, lifting and ambulation/stair navigation   [] (10261) Reviewed/Progressed HEP activities related to improving balance, coordination, kinesthetic sense, posture, motor skill, proprioception of core, proximal hip and LE for self-care, mobility, lifting, and ambulation/stair navigation      Manual Treatments:  PROM / STM / Oscillations-Mobs:  G-I, II, III, IV (PA's, Inf., Post.)  [x] (23953) Provided manual therapy to mobilize LE, proximal hip and/or LS spine soft tissue/joints for the purpose of modulating pain, promoting relaxation, increasing ROM, reducing/eliminating soft tissue swelling/inflammation/restriction, improving soft tissue extensibility and allowing for proper ROM for normal function with self-care, mobility, lifting and ambulation.      Modalities:     [] GAME READY (VASO)- for significant edema, swelling, pain control. Charges:  Timed Code Treatment Minutes: 35'    Total Treatment Minutes: 28'      [] EVAL (LOW) 455 1011 (typically 20 minutes face-to-face)  [] EVAL (MOD) 83606 (typically 30 minutes face-to-face)  [] EVAL (HIGH) 96685 (typically 45 minutes face-to-face)  [] RE-EVAL     [x] DO(01070)   [] IONTO   NMR (88611) x     [] VASO  [][x] Manual (53246) x    1 [] Other:  [] TA x      [] Mech Traction (91670)  [] ES(attended) (69789)      [] ES (un) (88596):         ASSESSMENT:  See eval      GOALS:   Patient stated goal:     [x] Progressing: [] Met: [] Not Met: [] Adjusted    Therapist goals for Patient:   Short Term Goals: To be achieved in: 2 weeks  1. Independent in HEP and progression per patient tolerance, in order to prevent re-injury. [x] Progressing: [] Met: [] Not Met: [] Adjusted   2. Patient will have a decrease in pain to facilitate improvement in movement, function, and ADLs as indicated by Functional Deficits. [x] Progressing: [] Met: [] Not Met: [] Adjusted    Long Term Goals: To be achieved in:    12 weeks  - The patient is expected to demonstrate less than 25% impairment, limitation or restriction in:  - carrying, moving and handling objects. - Patient will demonstrate increased passive ROM to a deficiency of only 15% to allow for proper joint functioning as indicated by patients Functional Deficits. [x] Progressing: [] Met: [] Not Met: [] Adjusted  - Patient will demonstrate an increase in Strength to 4-/5 to allow for proper functional mobility as indicated by patients Functional Deficits. [x] Progressing: [] Met: [] Not Met: [] Adjusted  - Patient will return to desired, higher level upper extremity functional activities without increased symptoms or restriction. [x] Progressing: [] Met: [] Not Met: [] Adjusted              Overall Progression Towards Functional goals/ Treatment Progress Update:  [] Patient is progressing as expected towards functional goals listed.     [] Progression is slowed due to complexities/Impairments listed. [] Progression has been slowed due to co-morbidities. [x] Plan just implemented, too soon to assess goals progression <30days   [] Goals require adjustment due to lack of progress  [] Patient is not progressing as expected and requires additional follow up with physician  [] Other    Prognosis for POC: [x] Good [] Fair  [] Poor      Patient requires continued skilled intervention: [x] Yes  [] No    Treatment/Activity Tolerance:  [x] Patient able to complete treatment  [] Patient limited by fatigue  [] Patient limited by pain    [] Patient limited by other medical complications  [] Other:         Return to Play: (if applicable)   []  Stage 1: Intro to Strength   []  Stage 2: Return to Run and Strength   []  Stage 3: Return to Jump and Strength   []  Stage 4: Dynamic Strength and Agility   []  Stage 5: Sport Specific Training     []  Ready to Return to Play, Meets All Above Stages   []  Not Ready for Return to Sports   Comments:                               PLAN: See eval  [x] Continue per plan of care [] Alter current plan (see comments above)  [] Plan of care initiated [] Hold pending MD visit [] Discharge      Electronically signed by:  Bryant Flannery, PT, MPT     Note: If patient does not return for scheduled/ recommended follow up visits, this note will serve as a discharge from care along with most recent update on progress.

## 2022-10-03 ENCOUNTER — OFFICE VISIT (OUTPATIENT)
Dept: ORTHOPEDIC SURGERY | Age: 67
End: 2022-10-03

## 2022-10-03 VITALS — WEIGHT: 208 LBS | BODY MASS INDEX: 27.57 KG/M2 | HEIGHT: 73 IN

## 2022-10-03 DIAGNOSIS — M25.512 LEFT SHOULDER PAIN, UNSPECIFIED CHRONICITY: ICD-10-CM

## 2022-10-03 DIAGNOSIS — Z96.612 STATUS POST TOTAL REPLACEMENT OF LEFT SHOULDER: Primary | ICD-10-CM

## 2022-10-03 PROCEDURE — 99024 POSTOP FOLLOW-UP VISIT: CPT | Performed by: PHYSICIAN ASSISTANT

## 2022-10-03 NOTE — PROGRESS NOTES
History of Present Illness:  Cuba Mcmanus is a 61 y.o. male who presents for a post operative visit. The patient underwent a left anatomic total shoulder arthroplasty and biceps tenodesis on 9/3012/2022. Patient is currently 3 weeks postop. Patient reports that the surgery has already feeling better than has last surgery on the opposite shoulder. Patient reports that he is off his pain meds and is having no pain at all. He reports he has been compliant with his sling. He does have the CPM machine at home and is currently going up to 90 degrees. He has been going to therapy once a week. The patient deny fevers, chills, numbness, tingling, and shortness of breath. Medical History:  Patient's medications, allergies, past medical, surgical, social and family histories were reviewed and updated as appropriate. No notes on file    Review of Systems  A 14 point review of systems was completed by the patient is available in the media section of the scanned medical record and was reviewed on 10/3/2022. Vital Signs: There were no vitals filed for this visit. General/Appearance: Alert and oriented and in no apparent distress. Skin:  There are no skin lesions, cellulitis, or extreme edema. The patient has warm and well-perfused Bilateral upper extremities with brisk capillary refill. left Shoulder Exam:    Inspection: left shoulder incision that is clean, dry and intact and well approximated. The Prineo dressing is still in place. Mild ecchymosis and swelling are present as can be expected. There is no erythema, drainage or other signs of infection    Palpation:  No crepitus to gentle motion    Active Range of Motion: Deferred    Passive Range of Motion: Forward elevation to 90 degrees and external rotation of 5 degrees    Strength:  Deferred    Special Tests:  Deferred.     Neurovascular: Sensation to light touch is intact, no motor deficits, palpable radial pulses 2+    Radiology:     Plain radiographs not obtained today. Assessment :  Mr. Deric Bear is a 61 y.o. patient underwent a left anatomic total shoulder arthroplasty with biceps tenodesis 3 weeks ago. He is doing quite well in his recovery so far. Impression:  Encounter Diagnoses   Name Primary? Status post total replacement of left shoulder Yes    Left shoulder pain, unspecified chronicity        Office Procedures:  No orders of the defined types were placed in this encounter. Treatment Plan:    Overall the patient is doing well. The pain is well-controlled. He can go ahead and discontinue sling at home and wear it primarily when he is outdoors in big crowds. He may also resume driving a car at this time. We will continue to progress him in formal physical therapy. Therapy orders were updated. He was also asked to progress with the CPM machine in terms of degrees. All of his questions were fully answered today. We would like to see him back in 3 weeks for follow-up visit. 1:16 PM    Noa Escamilla PA-C  Orthopaedic Sports Medicine Physician Assistant      This dictation was performed with a verbal recognition program [facility]) and it was checked for errors. It is possible that there are still dictated errors within this office note. If so, please bring any errors to my attention for an addendum. All efforts were made to ensure that this office note is accurate.

## 2022-10-04 ENCOUNTER — HOSPITAL ENCOUNTER (OUTPATIENT)
Dept: PHYSICAL THERAPY | Age: 67
Setting detail: THERAPIES SERIES
Discharge: HOME | End: 2022-10-04
Payer: COMMERCIAL

## 2022-10-04 PROCEDURE — 97110 THERAPEUTIC EXERCISES: CPT | Performed by: PHYSICAL THERAPIST

## 2022-10-04 PROCEDURE — 97140 MANUAL THERAPY 1/> REGIONS: CPT | Performed by: PHYSICAL THERAPIST

## 2022-10-08 NOTE — FLOWSHEET NOTE
The [facility] and [address],  Sports Performance and Rehabilitation, [facility] [address]199 [address]  793 [facility],5Th Floor   Carlo Perez  Phone: [phone]  Fax: [phone]       Physical Therapy Treatment Note/ Progress Report:           Date:  10/04/2022    Patient Name:  Ji Garcia    :  1955  MRN: [id number]  Restrictions/Precautions:    Medical/Treatment Diagnosis Information:     Left shoulder pain - m25.512      Insurance/Certification information:     Physician Information:     Has the plan of care been signed (Y/N):        []  Yes  [x]  No     Date of Patient follow up with Physician:       Is this a Progress Report:     []  Yes  [x]  No        If Yes:  Date Range for reporting period:  Beginning  Ending    Progress report will be due (10 Rx or 30 days whichever is less):        Recertification will be due (POC Duration  / 90 days whichever is less):          Visit # Insurance Allowable Auth Required   4  []  Yes []  No        Functional Scale: \    Date assessed:        Latex Allergy:  [x]NO      []YES  Preferred Language for Healthcare:   [x]English       []other:      Pain level:  /10     SUBJECTIVE:  \"I am feeling pretty good. Harden Beech \"     OBJECTIVE:    Observation: 50% cueing for hep   Test measurements:      RESTRICTIONS/PRECAUTIONS:     Exercises/Interventions:       Therapeutic Ex (46225) Sets/sec Reps Notes/CUES   Er to 0   5 x 15\"    Passive flex to 60  5 x 15\"    Scap retr's   30x     L/ld bicep stretch   5.5'                                               Manual Intervention (59859)      Prom/stm   16'                                                                                                                                            Therapeutic Exercise and NMR EXR  [x] (03938) Provided verbal/tactile cueing for activities related to strengthening, flexibility, endurance, ROM for improvements in LE, proximal hip, and core control with self care, mobility, lifting, ambulation. [x] (35092) Provided verbal/tactile cueing for activities related to improving balance, coordination, kinesthetic sense, posture, motor skill, proprioception to assist with LE, proximal hip, and core control in self-care, mobility, lifting, ambulation and eccentric single leg control. NMR and Therapeutic Activities:    [x] (52513 or 33107) Provided verbal/tactile cueing for activities related to improving balance, coordination, kinesthetic sense, posture, motor skill, proprioception and motor activation to allow for proper function of core, proximal hip and LE with self-care and ADLs and functional mobility.   [] (73971) Gait Re-education- Provided training and instruction to the patient for proper LE, core and proximal hip recruitment and positioning and eccentric body weight control with ambulation re-education including up and down stairs     Home Exercise Program:    [x] (72995) Reviewed/Progressed HEP activities related to strengthening, flexibility, endurance, ROM of core, proximal hip and LE for functional self-care, mobility, lifting and ambulation/stair navigation   [] (60087) Reviewed/Progressed HEP activities related to improving balance, coordination, kinesthetic sense, posture, motor skill, proprioception of core, proximal hip and LE for self-care, mobility, lifting, and ambulation/stair navigation      Manual Treatments:  PROM / STM / Oscillations-Mobs:  G-I, II, III, IV (PA's, Inf., Post.)  [x] (62624) Provided manual therapy to mobilize LE, proximal hip and/or LS spine soft tissue/joints for the purpose of modulating pain, promoting relaxation, increasing ROM, reducing/eliminating soft tissue swelling/inflammation/restriction, improving soft tissue extensibility and allowing for proper ROM for normal function with self-care, mobility, lifting and ambulation. Modalities:     [] GAME READY (VASO)- for significant edema, swelling, pain control. Charges:  Timed Code Treatment Minutes: 35'    Total Treatment Minutes: 28'      [] EVAL (LOW) 455 1011 (typically 20 minutes face-to-face)  [] EVAL (MOD) 36397 (typically 30 minutes face-to-face)  [] EVAL (HIGH) 22944 (typically 45 minutes face-to-face)  [] RE-EVAL     [x] KB(98196)   [] IONTO   NMR (24377) x     [] VASO  [][x] Manual (85193) x    1 [] Other:  [] TA x      [] Mech Traction (78781)  [] ES(attended) (89426)      [] ES (un) (91274):         ASSESSMENT:  See eval      GOALS:   Patient stated goal:     [x] Progressing: [] Met: [] Not Met: [] Adjusted    Therapist goals for Patient:   Short Term Goals: To be achieved in: 2 weeks  1. Independent in HEP and progression per patient tolerance, in order to prevent re-injury. [x] Progressing: [] Met: [] Not Met: [] Adjusted   2. Patient will have a decrease in pain to facilitate improvement in movement, function, and ADLs as indicated by Functional Deficits. [x] Progressing: [] Met: [] Not Met: [] Adjusted    Long Term Goals: To be achieved in:    12 weeks  - The patient is expected to demonstrate less than 25% impairment, limitation or restriction in:  - carrying, moving and handling objects. - Patient will demonstrate increased passive ROM to a deficiency of only 15% to allow for proper joint functioning as indicated by patients Functional Deficits. [x] Progressing: [] Met: [] Not Met: [] Adjusted  - Patient will demonstrate an increase in Strength to 4-/5 to allow for proper functional mobility as indicated by patients Functional Deficits. [x] Progressing: [] Met: [] Not Met: [] Adjusted  - Patient will return to desired, higher level upper extremity functional activities without increased symptoms or restriction. [x] Progressing: [] Met: [] Not Met: [] Adjusted              Overall Progression Towards Functional goals/ Treatment Progress Update:  [] Patient is progressing as expected towards functional goals listed.     [] Progression is slowed due to complexities/Impairments listed. [] Progression has been slowed due to co-morbidities. [x] Plan just implemented, too soon to assess goals progression <30days   [] Goals require adjustment due to lack of progress  [] Patient is not progressing as expected and requires additional follow up with physician  [] Other    Prognosis for POC: [x] Good [] Fair  [] Poor      Patient requires continued skilled intervention: [x] Yes  [] No    Treatment/Activity Tolerance:  [x] Patient able to complete treatment  [] Patient limited by fatigue  [] Patient limited by pain    [] Patient limited by other medical complications  [] Other:         Return to Play: (if applicable)   []  Stage 1: Intro to Strength   []  Stage 2: Return to Run and Strength   []  Stage 3: Return to Jump and Strength   []  Stage 4: Dynamic Strength and Agility   []  Stage 5: Sport Specific Training     []  Ready to Return to Play, Meets All Above Stages   []  Not Ready for Return to Sports   Comments:                               PLAN: See eval  [x] Continue per plan of care [] Alter current plan (see comments above)  [] Plan of care initiated [] Hold pending MD visit [] Discharge      Electronically signed by:  Conner James, PT, MPT     Note: If patient does not return for scheduled/ recommended follow up visits, this note will serve as a discharge from care along with most recent update on progress.

## 2022-10-11 ENCOUNTER — HOSPITAL ENCOUNTER (OUTPATIENT)
Dept: PHYSICAL THERAPY | Age: 67
Setting detail: THERAPIES SERIES
Discharge: HOME | End: 2022-10-11
Payer: COMMERCIAL

## 2022-10-11 PROCEDURE — 97110 THERAPEUTIC EXERCISES: CPT

## 2022-10-11 PROCEDURE — 97140 MANUAL THERAPY 1/> REGIONS: CPT

## 2022-10-11 NOTE — FLOWSHEET NOTE
The [facility] and [address],  Sports Performance and Rehabilitation, [facility] [address]  793 [facility],5Th Floor   [facility], [address]  Phone: [phone]  Fax: [phone]       Physical Therapy Treatment Note/ Progress Report:           Date:  10/11/22     Patient Name:  Kalyani Molina    :  1955  MRN: [id number]  Restrictions/Precautions:    Medical/Treatment Diagnosis Information:     Left shoulder pain - m25.512      Insurance/Certification information:     Physician Information:     Has the plan of care been signed (Y/N):        []  Yes  [x]  No     Date of Patient follow up with Physician:       Is this a Progress Report:     []  Yes  [x]  No        If Yes:  Date Range for reporting period:  Beginning  Ending    Progress report will be due (10 Rx or 30 days whichever is less):        Recertification will be due (POC Duration  / 90 days whichever is less):          Visit # Insurance Allowable Auth Required   5  []  Yes []  No        Functional Scale:     Date assessed:        Latex Allergy:  [x]NO      []YES  Preferred Language for Healthcare:   [x]English       []other:      Pain level:  0/10     SUBJECTIVE:  Post op: 4 weeks. Reports L shld is feeling good. Saw  Last week; D/C sling at home but continue wearing when out of house. Reports using CPM 1 hour x 3 day (@ 120 flexion).     OBJECTIVE:    Observation: 50% cueing for hep   Test measurements:      RESTRICTIONS/PRECAUTIONS:     Exercises/Interventions:       Therapeutic Ex (75686) Sets/sec Reps Notes/CUES   Er at 0 abd with cane   10 x 10\"    PROM shld flex to 90  20 x 5\" R UE assist   Rows   30x  Yellow tb   Bicep stretch   8 x 30 sec In supine/PT assist   Table slides  X 20 5 sec hold   Bicep curl  X 30 2 lb   BERNADETTE: ER & abd Next visit                                    Manual Intervention (32212)      Prom/stm   15' Therapeutic Exercise and NMR EXR  [x] (80006) Provided verbal/tactile cueing for activities related to strengthening, flexibility, endurance, ROM for improvements in LE, proximal hip, and core control with self care, mobility, lifting, ambulation. [x] (11411) Provided verbal/tactile cueing for activities related to improving balance, coordination, kinesthetic sense, posture, motor skill, proprioception to assist with LE, proximal hip, and core control in self-care, mobility, lifting, ambulation and eccentric single leg control.      NMR and Therapeutic Activities:    [x] (62252 or 35899) Provided verbal/tactile cueing for activities related to improving balance, coordination, kinesthetic sense, posture, motor skill, proprioception and motor activation to allow for proper function of core, proximal hip and LE with self-care and ADLs and functional mobility.   [] (94744) Gait Re-education- Provided training and instruction to the patient for proper LE, core and proximal hip recruitment and positioning and eccentric body weight control with ambulation re-education including up and down stairs     Home Exercise Program:    [x] (91252) Reviewed/Progressed HEP activities related to strengthening, flexibility, endurance, ROM of core, proximal hip and LE for functional self-care, mobility, lifting and ambulation/stair navigation   [] (60676) Reviewed/Progressed HEP activities related to improving balance, coordination, kinesthetic sense, posture, motor skill, proprioception of core, proximal hip and LE for self-care, mobility, lifting, and ambulation/stair navigation      Manual Treatments:  PROM / STM / Oscillations-Mobs:  G-I, II, III, IV (PA's, Inf., Post.)  [x] (49559) Provided manual therapy to mobilize LE, proximal hip and/or LS spine soft tissue/joints for the purpose of modulating pain, promoting relaxation, increasing ROM, reducing/eliminating soft tissue swelling/inflammation/restriction, improving soft tissue extensibility and allowing for proper ROM for normal function with self-care, mobility, lifting and ambulation. Modalities:     [] GAME READY (VASO)- for significant edema, swelling, pain control. Charges:  Timed Code Treatment Minutes: 35'    Total Treatment Minutes: 28'      [] EVAL (LOW) 455 1011 (typically 20 minutes face-to-face)  [] EVAL (MOD) 77408 (typically 30 minutes face-to-face)  [] EVAL (HIGH) 31192 (typically 45 minutes face-to-face)  [] RE-EVAL     [x] IP(99573)  X 2 [] IONTO   NMR (32093) x     [] VASO  [][x] Manual (58549) x    1 [] Other:  [] TA x      [] Mech Traction (23511)  [] ES(attended) (25709)      [] ES (un) (38951):         ASSESSMENT:  See eval      GOALS:   Patient stated goal:     [x] Progressing: [] Met: [] Not Met: [] Adjusted    Therapist goals for Patient:   Short Term Goals: To be achieved in: 2 weeks  1. Independent in HEP and progression per patient tolerance, in order to prevent re-injury. [x] Progressing: [] Met: [] Not Met: [] Adjusted   2. Patient will have a decrease in pain to facilitate improvement in movement, function, and ADLs as indicated by Functional Deficits. [x] Progressing: [] Met: [] Not Met: [] Adjusted    Long Term Goals: To be achieved in:    12 weeks  - The patient is expected to demonstrate less than 25% impairment, limitation or restriction in:  - carrying, moving and handling objects. - Patient will demonstrate increased passive ROM to a deficiency of only 15% to allow for proper joint functioning as indicated by patients Functional Deficits. [x] Progressing: [] Met: [] Not Met: [] Adjusted  - Patient will demonstrate an increase in Strength to 4-/5 to allow for proper functional mobility as indicated by patients Functional Deficits.    [x] Progressing: [] Met: [] Not Met: [] Adjusted  - Patient will return to desired, higher level upper extremity functional activities without along with most recent update on progress.

## 2022-10-12 ENCOUNTER — HOSPITAL ENCOUNTER (OUTPATIENT)
Dept: PHYSICAL THERAPY | Age: 67
Setting detail: THERAPIES SERIES
Discharge: HOME | End: 2022-10-12
Payer: COMMERCIAL

## 2022-10-12 PROBLEM — Z98.890 POST-OPERATIVE STATE: Status: RESOLVED | Noted: 2022-09-12 | Resolved: 2022-10-12

## 2022-10-12 PROCEDURE — 97110 THERAPEUTIC EXERCISES: CPT | Performed by: PHYSICAL THERAPIST

## 2022-10-12 PROCEDURE — 97140 MANUAL THERAPY 1/> REGIONS: CPT | Performed by: PHYSICAL THERAPIST

## 2022-10-13 ENCOUNTER — APPOINTMENT (OUTPATIENT)
Dept: PHYSICAL THERAPY | Age: 67
End: 2022-10-13
Payer: COMMERCIAL

## 2022-10-16 NOTE — FLOWSHEET NOTE
The [facility] and [address],  Sports Performance and Rehabilitation, [facility] [address]  [address] [place] [address],[address]   [place], [address]  Phone: [phone]  Fax: [phone]       Physical Therapy Treatment Note/ Progress Report:           Date:  10/12/2022    Patient Name:  Iraida Reveles    :  1955  MRN: [id number]  Restrictions/Precautions:    Medical/Treatment Diagnosis Information:     Left shoulder pain - m25.512      Insurance/Certification information:     Physician Information:     Has the plan of care been signed (Y/N):        []  Yes  [x]  No     Date of Patient follow up with Physician:       Is this a Progress Report:     []  Yes  [x]  No        If Yes:  Date Range for reporting period:  Beginning  Ending    Progress report will be due (10 Rx or 30 days whichever is less):        Recertification will be due (POC Duration  / 90 days whichever is less):          Visit # Insurance Allowable Auth Required   6  []  Yes []  No        Functional Scale:     Date assessed:        Latex Allergy:  [x]NO      []YES  Preferred Language for Healthcare:   [x]English       []other:      Pain level:  0/10     SUBJECTIVE:  \"doing well\"    OBJECTIVE:    Observation:  meeting rom restrictions   Test measurements:      RESTRICTIONS/PRECAUTIONS:     Exercises/Interventions:       Therapeutic Ex (68899) Sets/sec Reps Notes/CUES   Er at 0 abd with cane   10 x 10\"    PROM shld flex to 90  20 x 5\" R UE assist   Rows   30x  Yellow tb   Bicep stretch   8 x 30 sec In supine/PT assist   Table slides  X 20 5 sec hold   Bicep curl  X 30 2 lb   BRENADETTE: ER & abd Next visit                                    Manual Intervention (45871)      Prom/stm   15'                                                                                                                                            Therapeutic Exercise and NMR EXR  [x] (21438) Provided verbal/tactile cueing for activities related to strengthening, flexibility, endurance, ROM for improvements in LE, proximal hip, and core control with self care, mobility, lifting, ambulation. [x] (89440) Provided verbal/tactile cueing for activities related to improving balance, coordination, kinesthetic sense, posture, motor skill, proprioception to assist with LE, proximal hip, and core control in self-care, mobility, lifting, ambulation and eccentric single leg control.      NMR and Therapeutic Activities:    [x] (67818 or 14676) Provided verbal/tactile cueing for activities related to improving balance, coordination, kinesthetic sense, posture, motor skill, proprioception and motor activation to allow for proper function of core, proximal hip and LE with self-care and ADLs and functional mobility.   [] (63213) Gait Re-education- Provided training and instruction to the patient for proper LE, core and proximal hip recruitment and positioning and eccentric body weight control with ambulation re-education including up and down stairs     Home Exercise Program:    [x] (84884) Reviewed/Progressed HEP activities related to strengthening, flexibility, endurance, ROM of core, proximal hip and LE for functional self-care, mobility, lifting and ambulation/stair navigation   [] (59966) Reviewed/Progressed HEP activities related to improving balance, coordination, kinesthetic sense, posture, motor skill, proprioception of core, proximal hip and LE for self-care, mobility, lifting, and ambulation/stair navigation      Manual Treatments:  PROM / STM / Oscillations-Mobs:  G-I, II, III, IV (PA's, Inf., Post.)  [x] (81173) Provided manual therapy to mobilize LE, proximal hip and/or LS spine soft tissue/joints for the purpose of modulating pain, promoting relaxation, increasing ROM, reducing/eliminating soft tissue swelling/inflammation/restriction, improving soft tissue extensibility and allowing for proper ROM for normal function with self-care, mobility, lifting and ambulation. Modalities:     [] GAME READY (VASO)- for significant edema, swelling, pain control. Charges:  Timed Code Treatment Minutes: 35'   Total Treatment Minutes: 28'      [] EVAL (LOW) 455 1011 (typically 20 minutes face-to-face)  [] EVAL (MOD) 87409 (typically 30 minutes face-to-face)  [] EVAL (HIGH) 16933 (typically 45 minutes face-to-face)  [] RE-EVAL     [x] VX(60680)  X 1 [] IONTO   NMR (39625) x     [] VASO  [][x] Manual (67146) x    1 [] Other:  [] TA x      [] Mech Traction (81402)  [] ES(attended) (11382)      [] ES (un) (67488):         ASSESSMENT:  See eval      GOALS:   Patient stated goal:     [x] Progressing: [] Met: [] Not Met: [] Adjusted    Therapist goals for Patient:   Short Term Goals: To be achieved in: 2 weeks  1. Independent in HEP and progression per patient tolerance, in order to prevent re-injury. [x] Progressing: [] Met: [] Not Met: [] Adjusted   2. Patient will have a decrease in pain to facilitate improvement in movement, function, and ADLs as indicated by Functional Deficits. [x] Progressing: [] Met: [] Not Met: [] Adjusted    Long Term Goals: To be achieved in:    12 weeks  - The patient is expected to demonstrate less than 25% impairment, limitation or restriction in:  - carrying, moving and handling objects. - Patient will demonstrate increased passive ROM to a deficiency of only 15% to allow for proper joint functioning as indicated by patients Functional Deficits. [x] Progressing: [] Met: [] Not Met: [] Adjusted  - Patient will demonstrate an increase in Strength to 4-/5 to allow for proper functional mobility as indicated by patients Functional Deficits. [x] Progressing: [] Met: [] Not Met: [] Adjusted  - Patient will return to desired, higher level upper extremity functional activities without increased symptoms or restriction.        [x] Progressing: [] Met: [] Not Met: [] Adjusted              Overall Progression Towards Functional goals/ Treatment Progress Update:  [] Patient is progressing as expected towards functional goals listed. [] Progression is slowed due to complexities/Impairments listed. [] Progression has been slowed due to co-morbidities. [x] Plan just implemented, too soon to assess goals progression <30days   [] Goals require adjustment due to lack of progress  [] Patient is not progressing as expected and requires additional follow up with physician  [] Other    Prognosis for POC: [x] Good [] Fair  [] Poor      Patient requires continued skilled intervention: [x] Yes  [] No    Treatment/Activity Tolerance:  [x] Patient able to complete treatment  [] Patient limited by fatigue  [] Patient limited by pain    [] Patient limited by other medical complications  [] Other: Patient PROM/AAROM ahead of schedule per protocol. Patient did well with PT activities; gave pt yellow tb for rows for HEP & instructed pt to use max 2 lb for bicep curls at this time. Patient would continue to benefit from continued PT to improve L shld ROM, periscapular and RTC strength, and improve functional ability. Return to Play: (if applicable)   []  Stage 1: Intro to Strength   []  Stage 2: Return to Run and Strength   []  Stage 3: Return to Jump and Strength   []  Stage 4: Dynamic Strength and Agility   []  Stage 5: Sport Specific Training     []  Ready to Return to Play, Meets All Above Stages   []  Not Ready for Return to Sports   Comments:                               PLAN: See eval  [x] Continue per plan of care [] Alter current plan (see comments above)  [] Plan of care initiated [] Hold pending MD visit [] Discharge      Electronically signed by:  Bo Echols PT, MPT     Note: If patient does not return for scheduled/ recommended follow up visits, this note will serve as a discharge from care along with most recent update on progress.

## 2022-10-17 ENCOUNTER — HOSPITAL ENCOUNTER (OUTPATIENT)
Dept: PHYSICAL THERAPY | Age: 67
Setting detail: THERAPIES SERIES
Discharge: HOME | End: 2022-10-17
Payer: COMMERCIAL

## 2022-10-19 ENCOUNTER — HOSPITAL ENCOUNTER (OUTPATIENT)
Dept: PHYSICAL THERAPY | Age: 67
Setting detail: THERAPIES SERIES
Discharge: HOME | End: 2022-10-19
Payer: COMMERCIAL

## 2022-10-19 PROCEDURE — 97110 THERAPEUTIC EXERCISES: CPT | Performed by: PHYSICAL THERAPIST

## 2022-10-19 PROCEDURE — 97140 MANUAL THERAPY 1/> REGIONS: CPT | Performed by: PHYSICAL THERAPIST

## 2022-10-23 NOTE — FLOWSHEET NOTE
The [facility] and [address],  Sports Performance and Rehabilitation, [facility] [address]  [address] [facility],5[address]   Carlo Perez  Phone: [phone]  Fax: [phone]       Physical Therapy Treatment Note/ Progress Report:           Date:  10/19/2022    Patient Name:  Kalyani Molina    :  1955  MRN: [id number]  Restrictions/Precautions:    Medical/Treatment Diagnosis Information:     Left shoulder pain - m25.512      Insurance/Certification information:     Physician Information:     Has the plan of care been signed (Y/N):        []  Yes  [x]  No     Date of Patient follow up with Physician:       Is this a Progress Report:     []  Yes  [x]  No        If Yes:  Date Range for reporting period:  Beginning  Ending    Progress report will be due (10 Rx or 30 days whichever is less):        Recertification will be due (POC Duration  / 90 days whichever is less):          Visit # Insurance Allowable Auth Required   8  []  Yes []  No        Functional Scale:     Date assessed:        Latex Allergy:  [x]NO      []YES  Preferred Language for Healthcare:   [x]English       []other:      Pain level:  0/10     SUBJECTIVE:  \"I am getting better\"    OBJECTIVE:    Observation:  meeting rom restrictions   Test measurements:      RESTRICTIONS/PRECAUTIONS:     Exercises/Interventions:       Therapeutic Ex (96551) Sets/sec Reps Notes/CUES   Er at 0 abd with cane   10 x 10\"          Rows   30x  Red tb   Bicep stretch   8 x 30 sec In supine/PT assist   Table slides  X 6x 10 sec hold   Bicep curl  X 30 2 lb   Tricep extensions   X 20 1#   Wall walk (limit at 110)  5 x 10\"         Pulleys   3'                     Manual Intervention (91022)      Prom/stm   25'                                                                                                                                            Therapeutic Exercise and NMR EXR  [x] (77314) Provided verbal/tactile cueing for activities related to strengthening, flexibility, endurance, ROM for improvements in LE, proximal hip, and core control with self care, mobility, lifting, ambulation. [x] (74102) Provided verbal/tactile cueing for activities related to improving balance, coordination, kinesthetic sense, posture, motor skill, proprioception to assist with LE, proximal hip, and core control in self-care, mobility, lifting, ambulation and eccentric single leg control.      NMR and Therapeutic Activities:    [x] (98935 or 76558) Provided verbal/tactile cueing for activities related to improving balance, coordination, kinesthetic sense, posture, motor skill, proprioception and motor activation to allow for proper function of core, proximal hip and LE with self-care and ADLs and functional mobility.   [] (16784) Gait Re-education- Provided training and instruction to the patient for proper LE, core and proximal hip recruitment and positioning and eccentric body weight control with ambulation re-education including up and down stairs     Home Exercise Program:    [x] (23973) Reviewed/Progressed HEP activities related to strengthening, flexibility, endurance, ROM of core, proximal hip and LE for functional self-care, mobility, lifting and ambulation/stair navigation   [] (89927) Reviewed/Progressed HEP activities related to improving balance, coordination, kinesthetic sense, posture, motor skill, proprioception of core, proximal hip and LE for self-care, mobility, lifting, and ambulation/stair navigation      Manual Treatments:  PROM / STM / Oscillations-Mobs:  G-I, II, III, IV (PA's, Inf., Post.)  [x] (29545) Provided manual therapy to mobilize LE, proximal hip and/or LS spine soft tissue/joints for the purpose of modulating pain, promoting relaxation, increasing ROM, reducing/eliminating soft tissue swelling/inflammation/restriction, improving soft tissue extensibility and allowing for proper ROM for normal function with self-care, mobility, lifting and ambulation. Modalities:     [] GAME READY (VASO)- for significant edema, swelling, pain control. Charges:  Timed Code Treatment Minutes: 35'   Total Treatment Minutes: 28'      [] EVAL (LOW) 455 1011 (typically 20 minutes face-to-face)  [] EVAL (MOD) 06631 (typically 30 minutes face-to-face)  [] EVAL (HIGH) 98502 (typically 45 minutes face-to-face)  [] RE-EVAL     [x] RQ(58720)  X 1 [] IONTO   NMR (20754) x     [] VASO  [][x] Manual (21856) x   1 [] Other:  [] TA x      [] Mech Traction (04858)  [] ES(attended) (10624)      [] ES (un) (67125):         ASSESSMENT:  See eval      GOALS:   Patient stated goal:     [x] Progressing: [] Met: [] Not Met: [] Adjusted    Therapist goals for Patient:   Short Term Goals: To be achieved in: 2 weeks  1. Independent in HEP and progression per patient tolerance, in order to prevent re-injury. [x] Progressing: [] Met: [] Not Met: [] Adjusted   2. Patient will have a decrease in pain to facilitate improvement in movement, function, and ADLs as indicated by Functional Deficits. [x] Progressing: [] Met: [] Not Met: [] Adjusted    Long Term Goals: To be achieved in:    12 weeks  - The patient is expected to demonstrate less than 25% impairment, limitation or restriction in:  - carrying, moving and handling objects. - Patient will demonstrate increased passive ROM to a deficiency of only 15% to allow for proper joint functioning as indicated by patients Functional Deficits. [x] Progressing: [] Met: [] Not Met: [] Adjusted  - Patient will demonstrate an increase in Strength to 4-/5 to allow for proper functional mobility as indicated by patients Functional Deficits. [x] Progressing: [] Met: [] Not Met: [] Adjusted  - Patient will return to desired, higher level upper extremity functional activities without increased symptoms or restriction.        [x] Progressing: [] Met: [] Not Met: [] Adjusted              Overall Progression Towards Functional goals/ Treatment Progress Update:  [] Patient is progressing as expected towards functional goals listed. [] Progression is slowed due to complexities/Impairments listed. [] Progression has been slowed due to co-morbidities. [x] Plan just implemented, too soon to assess goals progression <30days   [] Goals require adjustment due to lack of progress  [] Patient is not progressing as expected and requires additional follow up with physician  [] Other    Prognosis for POC: [x] Good [] Fair  [] Poor      Patient requires continued skilled intervention: [x] Yes  [] No    Treatment/Activity Tolerance:  [x] Patient able to complete treatment  [] Patient limited by fatigue  [] Patient limited by pain    [] Patient limited by other medical complications  [] Other: Patient PROM/AAROM ahead of schedule per protocol. Patient did well with PT activities; gave pt yellow tb for rows for HEP & instructed pt to use max 2 lb for bicep curls at this time. Patient would continue to benefit from continued PT to improve L shld ROM, periscapular and RTC strength, and improve functional ability. Return to Play: (if applicable)   []  Stage 1: Intro to Strength   []  Stage 2: Return to Run and Strength   []  Stage 3: Return to Jump and Strength   []  Stage 4: Dynamic Strength and Agility   []  Stage 5: Sport Specific Training     []  Ready to Return to Play, Meets All Above Stages   []  Not Ready for Return to Sports   Comments:                               PLAN: See eval  [x] Continue per plan of care [] Alter current plan (see comments above)  [] Plan of care initiated [] Hold pending MD visit [] Discharge      Electronically signed by:  Jada Morris, PT, MPT     Note: If patient does not return for scheduled/ recommended follow up visits, this note will serve as a discharge from care along with most recent update on progress.

## 2022-10-25 ENCOUNTER — HOSPITAL ENCOUNTER (OUTPATIENT)
Dept: PHYSICAL THERAPY | Age: 67
Setting detail: THERAPIES SERIES
Discharge: HOME | End: 2022-10-25
Payer: COMMERCIAL

## 2022-10-25 ENCOUNTER — OFFICE VISIT (OUTPATIENT)
Dept: ORTHOPEDIC SURGERY | Age: 67
End: 2022-10-25

## 2022-10-25 VITALS — WEIGHT: 208 LBS | HEIGHT: 73 IN | BODY MASS INDEX: 27.57 KG/M2

## 2022-10-25 DIAGNOSIS — Z96.612 STATUS POST TOTAL REPLACEMENT OF LEFT SHOULDER: Primary | ICD-10-CM

## 2022-10-25 PROCEDURE — 97110 THERAPEUTIC EXERCISES: CPT

## 2022-10-25 PROCEDURE — 99024 POSTOP FOLLOW-UP VISIT: CPT | Performed by: ORTHOPAEDIC SURGERY

## 2022-10-25 PROCEDURE — 97140 MANUAL THERAPY 1/> REGIONS: CPT

## 2022-10-25 NOTE — FLOWSHEET NOTE
The [facility] and [address],  Sports Performance and Rehabilitation, [facility] [address]199 [address]  [address] [facility],5[address]   Car[place] Perez  Phone: [phone]  Fax: [phone]       Physical Therapy Treatment Note/ Progress Report:           Date:  10/19/2022    Patient Name:  Kalyan Jackson    :  1955  MRN: [id number]  Restrictions/Precautions:    Medical/Treatment Diagnosis Information:     Left shoulder pain - m25.512      Insurance/Certification information:     Physician Information:     Has the plan of care been signed (Y/N):        []  Yes  [x]  No     Date of Patient follow up with Physician:       Is this a Progress Report:     []  Yes  [x]  No        If Yes:  Date Range for reporting period:  Beginning  Ending    Progress report will be due (10 Rx or 30 days whichever is less):        Recertification will be due (POC Duration  / 90 days whichever is less):          Visit # Insurance Allowable Auth Required   9  []  Yes []  No        Functional Scale:     Date assessed:        Latex Allergy:  [x]NO      []YES  Preferred Language for Healthcare:   [x]English       []other:      Pain level:  0/10     SUBJECTIVE:  Reports shoulder feeling fantastic.  -  OBJECTIVE:    Observation:  meeting rom restrictions   Test measurements:      RESTRICTIONS/PRECAUTIONS:     Exercises/Interventions:       Therapeutic Ex (24353) Sets/sec Reps Notes/CUES   Er at 0 abd with cane   10 x 10\"          Rows   30x  Red tb   Bicep stretch   8 x 30 sec In supine/PT assist   Table slides  X 6x 10 sec hold   Bicep curl  X 30 2 lb   Tricep extensions   X 30 1#   Wall walk (limit at 110)  5 x 10\"         Pulleys   3'                     Manual Intervention (22316)      Prom/stm   25'                                                                                                                                            Therapeutic Exercise and NMR EXR  [x] (70831) Provided verbal/tactile cueing for activities related to strengthening, flexibility, endurance, ROM for improvements in LE, proximal hip, and core control with self care, mobility, lifting, ambulation. [x] (72893) Provided verbal/tactile cueing for activities related to improving balance, coordination, kinesthetic sense, posture, motor skill, proprioception to assist with LE, proximal hip, and core control in self-care, mobility, lifting, ambulation and eccentric single leg control.      NMR and Therapeutic Activities:    [x] (83563 or 35157) Provided verbal/tactile cueing for activities related to improving balance, coordination, kinesthetic sense, posture, motor skill, proprioception and motor activation to allow for proper function of core, proximal hip and LE with self-care and ADLs and functional mobility.   [] (03200) Gait Re-education- Provided training and instruction to the patient for proper LE, core and proximal hip recruitment and positioning and eccentric body weight control with ambulation re-education including up and down stairs     Home Exercise Program:    [x] (93216) Reviewed/Progressed HEP activities related to strengthening, flexibility, endurance, ROM of core, proximal hip and LE for functional self-care, mobility, lifting and ambulation/stair navigation   [] (91489) Reviewed/Progressed HEP activities related to improving balance, coordination, kinesthetic sense, posture, motor skill, proprioception of core, proximal hip and LE for self-care, mobility, lifting, and ambulation/stair navigation      Manual Treatments:  PROM / STM / Oscillations-Mobs:  G-I, II, III, IV (PA's, Inf., Post.)  [x] (06396) Provided manual therapy to mobilize LE, proximal hip and/or LS spine soft tissue/joints for the purpose of modulating pain, promoting relaxation, increasing ROM, reducing/eliminating soft tissue swelling/inflammation/restriction, improving soft tissue extensibility and allowing for proper ROM for normal function with self-care, mobility, lifting and ambulation. Modalities:     [] GAME READY (VASO)- for significant edema, swelling, pain control. Charges:  Timed Code Treatment Minutes: 35'   Total Treatment Minutes: 28'      [] EVAL (LOW) 455 1011 (typically 20 minutes face-to-face)  [] EVAL (MOD) 40623 (typically 30 minutes face-to-face)  [] EVAL (HIGH) 70784 (typically 45 minutes face-to-face)  [] RE-EVAL     [x] TT(73021)  X 1 [] IONTO   NMR (11070) x     [] VASO  [][x] Manual (08908) x   1 [] Other:  [] TA x      [] Mech Traction (06282)  [] ES(attended) (68949)      [] ES (un) (29387):         ASSESSMENT:  See eval      GOALS:   Patient stated goal:     [x] Progressing: [] Met: [] Not Met: [] Adjusted    Therapist goals for Patient:   Short Term Goals: To be achieved in: 2 weeks  1. Independent in HEP and progression per patient tolerance, in order to prevent re-injury. [x] Progressing: [] Met: [] Not Met: [] Adjusted   2. Patient will have a decrease in pain to facilitate improvement in movement, function, and ADLs as indicated by Functional Deficits. [x] Progressing: [] Met: [] Not Met: [] Adjusted    Long Term Goals: To be achieved in:    12 weeks  - The patient is expected to demonstrate less than 25% impairment, limitation or restriction in:  - carrying, moving and handling objects. - Patient will demonstrate increased passive ROM to a deficiency of only 15% to allow for proper joint functioning as indicated by patients Functional Deficits. [x] Progressing: [] Met: [] Not Met: [] Adjusted  - Patient will demonstrate an increase in Strength to 4-/5 to allow for proper functional mobility as indicated by patients Functional Deficits. [x] Progressing: [] Met: [] Not Met: [] Adjusted  - Patient will return to desired, higher level upper extremity functional activities without increased symptoms or restriction.        [x] Progressing: [] Met: [] Not Met: [] Adjusted              Overall Progression Towards Functional goals/ Treatment Progress Update:  [] Patient is progressing as expected towards functional goals listed. [] Progression is slowed due to complexities/Impairments listed. [] Progression has been slowed due to co-morbidities. [x] Plan just implemented, too soon to assess goals progression <30days   [] Goals require adjustment due to lack of progress  [] Patient is not progressing as expected and requires additional follow up with physician  [] Other    Prognosis for POC: [x] Good [] Fair  [] Poor      Patient requires continued skilled intervention: [x] Yes  [] No    Treatment/Activity Tolerance:  [x] Patient able to complete treatment  [] Patient limited by fatigue  [] Patient limited by pain    [] Patient limited by other medical complications  [] Other: Patient PROM/AAROM ahead of schedule per protocol. Patient is progressing very well at this time. Cueing required for rows to keep UT relaxed. Patient would continue to benefit from continued PT to improve L shld ROM, periscapular and RTC strength, and improve functional ability. Return to Play: (if applicable)   []  Stage 1: Intro to Strength   []  Stage 2: Return to Run and Strength   []  Stage 3: Return to Jump and Strength   []  Stage 4: Dynamic Strength and Agility   []  Stage 5: Sport Specific Training     []  Ready to Return to Play, Meets All Above Stages   []  Not Ready for Return to Sports   Comments:                               PLAN: See eval  [x] Continue per plan of care [] Alter current plan (see comments above)  [] Plan of care initiated [] Hold pending MD visit [] Discharge      Electronically signed by:  Jakub Hernandez, PT, MPT     Note: If patient does not return for scheduled/ recommended follow up visits, this note will serve as a discharge from care along with most recent update on progress.

## 2022-10-25 NOTE — LETTER
Physical Therapy Rehabilitation Referral    Patient Name:  Kalyani Molina      YOB: 1955    Diagnosis:    1. Status post total replacement of left shoulder        Precautions: subscapularis   [x] Evaluate and Treat    Post Op Instructions:  [] Continuous passive motion (CPM) [x] Elbow ROM  [x] Exercise in plane of scapula  [x]  Strengthening     [x] Pulley and instruction   [x] Home exercise program (copy to patient)   [] Sling when arm at risk  [] Sling or brace at all times   [x] AAROM: Forward elevation to  140            [x] AAROM: External rotation  To  40    [] Isometric external rotator strengthening [x] AAROM: internal rotation: up the back  [x] Isometric abductor strengthening  [x] AAROM: Internal abduction   [] Isometric internal rotator strengthening [x] AAROM: cross-body adduction             Stretching:     Strengthening:  [x] Four quadrant (FE, ER, IR, CBA)  [] Rotator cuff (ER, IR, Abd)  [x] Forward Elevation    [] External Rotators     [x] External Rotation    [] Internal Rotators  [x] Internal Rotation: up/back   [] Abductors     [x] Internal Rotation: supine in abduction  [] Sleeper Stretch    [] Flexors  [] Cross-body abduction    [] Extensors  [x] Pendulum (FE, Abd/Add, cw/ccw)  [x] Scapular Stabilizers   [] Wall-walking (FE, Abd)        [x] Shoulder shrugs     [x] Table slides (FE)                [x] Rhomboid pinch  [] Elbow (flex, ext, pron, sup)        [] Lat.  Pull downs     [] Medial epicondylitis program       [] Forward punch   [] Lateral epicondylitis program       [] Internal rotators     [] Progressive resistive exercises  [] Bench Press        [] Bench press plus  Activities:     [] Lateral pull-downs  [] Rowing     [] Progressive two-hand supine press  [] Stepper/Exercise bike   [x] Biceps: curls/supination  [] Swimming  [] Water exercises    Modalities:     Return to Sport:  [x] Of Choice      [] Plyometrics  [] Ultrasound     [] Rhythmic stabilization  [] Iontophoresis    [] Core strengthening   [] Moist heat     [] Sports specific program:   [] Massage         [x] Cryotherapy      [] Electrical stimulation     [] Paraffin  [] Whirlpool  [] TENS    [x] Home exercise program (copy to patient). Perform exercises for:   15     minutes    3      times/day  [x] Supervised physical therapy  Frequency: []  1x week  [x] 2x week  [] 3x week  [] Other:   Duration: [] 2 weeks   [] 4 weeks  [x] 6 weeks  [] Other:     Additional Instructions:     Amada Lambert MD, PhD

## 2022-10-25 NOTE — PROGRESS NOTES
History of Present Illness:  Sarthak Kauffman is a 61 y.o. male who presents for a post operative visit. The patient underwent a left anatomic total shoulder arthroplasty on 9/12/2022. He is currently 6 weeks postop. The patient reports he is doing quite well and has no questions or concerns regarding his left shoulder. He is going to physical therapy once a week. The patient denies any fevers, chills, numbness, tingling, and shortness of breath. Medical History:  Patient's medications, allergies, past medical, surgical, social and family histories were reviewed and updated as appropriate. No notes on file    Review of Systems  A 14 point review of systems was completed by the patient is available in the media section of the scanned medical record and was reviewed on 10/25/2022. Vital Signs: There were no vitals filed for this visit. General/Appearance: Alert and oriented and in no apparent distress. Skin:  There are no skin lesions, cellulitis, or extreme edema. The patient has warm and well-perfused Bilateral upper extremities with brisk capillary refill. left Shoulder Exam:    Inspection: left shoulder incision that is clean, dry and intact and well approximated. Mild ecchymosis and swelling are present as can be expected. There is no erythema, drainage or other signs of infection    Palpation:  No crepitus to gentle motion    Active Range of Motion: Forward elevation of 130 degrees, external rotation of 25 degrees and internal rotation in the back is to L5    Passive Range of Motion:  deferred    Strength:  Deferred    Special Tests: Slightly positive Nels[place], no Onofre muscle deformity.     Neurovascular: Sensation to light touch is intact, no motor deficits, palpable radial pulses 2+    Radiology:     Plain radiographs of the left shoulder comprising 2 views: AP and axillary lateral were obtained and reviewed in the office: Shows postsurgical changes from the total shoulder replacement. All the components are in good placement without any signs of loosening, fractures, subluxations or dislocations. Impression: Stable postop x-ray. Assessment :  Mr. Nighat Bowen is a 61 y.o. patient who underwent a left anatomic total shoulder arthroplasty on 9/12/2022. He is doing exceptionally well. Impression:  Encounter Diagnosis   Name Primary? Status post total replacement of left shoulder Yes       Office Procedures:  Orders Placed This Encounter   Procedures    XR SHOULDER LEFT (MIN 2 VIEWS)     Standing Status:   Future     Number of Occurrences:   1     Standing Expiration Date:   10/25/2023     Order Specific Question:   Reason for exam:     Answer:   F/U    [facility] - [place]     Referral Priority:   Routine     Referral Type:   Eval and Treat     Referral Reason:   Specialty Services Required     Requested Specialty:   Physical Therapist     Number of Visits Requested:   1       Treatment Plan:    Overall, Nighat Bowen is doing well. The pain is well-controlled. He was asked to discontinue the sling completely. His therapy orders were updated. We will give a print out of their physical therapy order. He may continue to go once a week since he is ahead of schedule however if he feels more comfortable going twice a week it would be fine with us. All of his questions were fully answered today. We would like to see him back in 4 weeks for follow-up visit. 10:00 AM      Kamla Graves PA-C  Orthopaedic Sports Medicine Physician Assistant    During this examination, I, Kamla Graves PA-C, functioned as a scribe for Dr. Amilcar Hanson. This dictation was performed with a verbal recognition program (DRAGON) and it was checked for errors. It is possible that there are still dictated errors within this office note. If so, please bring any errors to my attention for an addendum.   All efforts were made to ensure that this office note is accurate.  ___________________  I, Dr. Butch Pelletier, personally performed the services described in this documentation as described by Mihir Marshall PA-C in my presence, and it is both accurate and complete. Amada Delcid MD, PhD  10/25/2022

## 2022-11-02 ENCOUNTER — HOSPITAL ENCOUNTER (OUTPATIENT)
Dept: PHYSICAL THERAPY | Age: 67
Setting detail: THERAPIES SERIES
Discharge: HOME | End: 2022-11-02
Payer: COMMERCIAL

## 2022-11-02 PROCEDURE — 97140 MANUAL THERAPY 1/> REGIONS: CPT | Performed by: PHYSICAL THERAPIST

## 2022-11-02 PROCEDURE — 97110 THERAPEUTIC EXERCISES: CPT | Performed by: PHYSICAL THERAPIST

## 2022-11-03 NOTE — FLOWSHEET NOTE
The [facility] and [address] [place] [address]. [address],  Sports Performance and Rehabilitation, [facility] [address]  [address] [facility],5[address] Floor   Carlo Perez  Phone: [phone]  Fax: [phone]       Physical Therapy Treatment Note/ Progress Report:           Date:  2022    Patient Name:  Neelam May    :  1955  MRN: [id number]  Restrictions/Precautions:    Medical/Treatment Diagnosis Information:     Left shoulder pain - m25.512      Insurance/Certification information:     Physician Information:     Has the plan of care been signed (Y/N):        []  Yes  [x]  No     Date of Patient follow up with Physician:       Is this a Progress Report:     []  Yes  [x]  No        If Yes:  Date Range for reporting period:  Beginning  Ending    Progress report will be due (10 Rx or 30 days whichever is less):        Recertification will be due (POC Duration  / 90 days whichever is less):          Visit # Insurance Allowable Auth Required   10  []  Yes []  No        Functional Scale:     Date assessed:        Latex Allergy:  [x]NO      []YES  Preferred Language for Healthcare:   [x]English       []other:      Pain level:  0/10     SUBJECTIVE:  \"doing well\"  -  OBJECTIVE:    Observation:  meeting rom restrictions; 4-/5 strength    Test measurements:      RESTRICTIONS/PRECAUTIONS:     Exercises/Interventions:       Therapeutic Ex (74134) Sets/sec Reps Notes/CUES   Er at 0 abd with cane   10 x 10\"          Rows   30x  Red tb   Bicep stretch   8 x 30 sec In supine/PT assist   Table slides  X 6x 10 sec hold   Bicep curl  X 30 2 lb   Tricep extensions   X 30 1#   Wall walk (limit at 110)  5 x 10\"         Pulleys   3'                     Manual Intervention (24313)      Prom/stm   25'                                                                                                                                            Therapeutic Exercise and NMR EXR  [x] (74449) Provided verbal/tactile cueing for activities related to strengthening, flexibility, endurance, ROM for improvements in LE, proximal hip, and core control with self care, mobility, lifting, ambulation. [x] (11338) Provided verbal/tactile cueing for activities related to improving balance, coordination, kinesthetic sense, posture, motor skill, proprioception to assist with LE, proximal hip, and core control in self-care, mobility, lifting, ambulation and eccentric single leg control.      NMR and Therapeutic Activities:    [x] (19191 or 75960) Provided verbal/tactile cueing for activities related to improving balance, coordination, kinesthetic sense, posture, motor skill, proprioception and motor activation to allow for proper function of core, proximal hip and LE with self-care and ADLs and functional mobility.   [] (75597) Gait Re-education- Provided training and instruction to the patient for proper LE, core and proximal hip recruitment and positioning and eccentric body weight control with ambulation re-education including up and down stairs     Home Exercise Program:    [x] (57681) Reviewed/Progressed HEP activities related to strengthening, flexibility, endurance, ROM of core, proximal hip and LE for functional self-care, mobility, lifting and ambulation/stair navigation   [] (81490) Reviewed/Progressed HEP activities related to improving balance, coordination, kinesthetic sense, posture, motor skill, proprioception of core, proximal hip and LE for self-care, mobility, lifting, and ambulation/stair navigation      Manual Treatments:  PROM / STM / Oscillations-Mobs:  G-I, II, III, IV (PA's, Inf., Post.)  [x] (83444) Provided manual therapy to mobilize LE, proximal hip and/or LS spine soft tissue/joints for the purpose of modulating pain, promoting relaxation, increasing ROM, reducing/eliminating soft tissue swelling/inflammation/restriction, improving soft tissue extensibility and allowing for proper ROM for normal function with self-care, mobility, lifting and ambulation. Modalities:     [] GAME READY (VASO)- for significant edema, swelling, pain control. Charges:  Timed Code Treatment Minutes: 43'   Total Treatment Minutes: 43'      [] EVAL (LOW) 37498 (typically 20 minutes face-to-face)  [] EVAL (MOD) 07692 (typically 30 minutes face-to-face)  [] EVAL (HIGH) 64305 (typically 45 minutes face-to-face)  [] RE-EVAL     [x] NU(02484)  X 1 [] IONTO   NMR (29278) x     [] VASO  [][x] Manual (10758) x   2 [] Other:  [] TA x      [] Mech Traction (23710)  [] ES(attended) (10183)      [] ES (un) (98489):         ASSESSMENT:  See eval      GOALS:   Patient stated goal:     [x] Progressing: [] Met: [] Not Met: [] Adjusted    Therapist goals for Patient:   Short Term Goals: To be achieved in: 2 weeks  1. Independent in HEP and progression per patient tolerance, in order to prevent re-injury. [x] Progressing: [] Met: [] Not Met: [] Adjusted   2. Patient will have a decrease in pain to facilitate improvement in movement, function, and ADLs as indicated by Functional Deficits. [x] Progressing: [] Met: [] Not Met: [] Adjusted    Long Term Goals: To be achieved in:    12 weeks  - The patient is expected to demonstrate less than 25% impairment, limitation or restriction in:  - carrying, moving and handling objects. - Patient will demonstrate increased passive ROM to a deficiency of only 15% to allow for proper joint functioning as indicated by patients Functional Deficits. [x] Progressing: [] Met: [] Not Met: [] Adjusted  - Patient will demonstrate an increase in Strength to 4-/5 to allow for proper functional mobility as indicated by patients Functional Deficits. [x] Progressing: [] Met: [] Not Met: [] Adjusted  - Patient will return to desired, higher level upper extremity functional activities without increased symptoms or restriction.        [x] Progressing: [] Met: [] Not Met: [] Adjusted              Overall Progression Towards Functional goals/ Treatment Progress Update:  [] Patient is progressing as expected towards functional goals listed. [] Progression is slowed due to complexities/Impairments listed. [] Progression has been slowed due to co-morbidities. [x] Plan just implemented, too soon to assess goals progression <30days   [] Goals require adjustment due to lack of progress  [] Patient is not progressing as expected and requires additional follow up with physician  [] Other    Prognosis for POC: [x] Good [] Fair  [] Poor      Patient requires continued skilled intervention: [x] Yes  [] No    Treatment/Activity Tolerance:  [x] Patient able to complete treatment  [] Patient limited by fatigue  [] Patient limited by pain    [] Patient limited by other medical complications  [] Other: Patient PROM/AAROM ahead of schedule per protocol. Patient is progressing very well at this time. Cueing required for rows to keep UT relaxed. Patient would continue to benefit from continued PT to improve L shld ROM, periscapular and RTC strength, and improve functional ability. Return to Play: (if applicable)   []  Stage 1: Intro to Strength   []  Stage 2: Return to Run and Strength   []  Stage 3: Return to Jump and Strength   []  Stage 4: Dynamic Strength and Agility   []  Stage 5: Sport Specific Training     []  Ready to Return to Play, Meets All Above Stages   []  Not Ready for Return to Sports   Comments:                               PLAN: See eval  [x] Continue per plan of care [] Alter current plan (see comments above)  [] Plan of care initiated [] Hold pending MD visit [] Discharge      Electronically signed by:  Minal Parker, PT, MPT     Note: If patient does not return for scheduled/ recommended follow up visits, this note will serve as a discharge from care along with most recent update on progress.

## 2022-11-09 ENCOUNTER — HOSPITAL ENCOUNTER (OUTPATIENT)
Dept: PHYSICAL THERAPY | Age: 67
Setting detail: THERAPIES SERIES
Discharge: HOME | End: 2022-11-09
Payer: COMMERCIAL

## 2022-11-09 PROCEDURE — 97110 THERAPEUTIC EXERCISES: CPT | Performed by: PHYSICAL THERAPIST

## 2022-11-09 PROCEDURE — 97140 MANUAL THERAPY 1/> REGIONS: CPT | Performed by: PHYSICAL THERAPIST

## 2022-11-11 ENCOUNTER — HOSPITAL ENCOUNTER (OUTPATIENT)
Dept: PHYSICAL THERAPY | Age: 67
Setting detail: THERAPIES SERIES
Discharge: HOME | End: 2022-11-11
Payer: COMMERCIAL

## 2022-11-11 PROCEDURE — 97112 NEUROMUSCULAR REEDUCATION: CPT | Performed by: PHYSICAL THERAPIST

## 2022-11-11 PROCEDURE — 97110 THERAPEUTIC EXERCISES: CPT | Performed by: PHYSICAL THERAPIST

## 2022-11-11 PROCEDURE — 97140 MANUAL THERAPY 1/> REGIONS: CPT | Performed by: PHYSICAL THERAPIST

## 2022-11-13 NOTE — FLOWSHEET NOTE
The [facility] and [address],  Sports Performance and Rehabilitation, [facility] [address]  793 [facility],5[address] Floor   Carlo Perez  Phone: [phone]  Fax: [phone]       Physical Therapy Treatment Note/ Progress Report:           Date:  2022    Patient Name:  Gracy George    :  1955  MRN: [id number]  Restrictions/Precautions:    Medical/Treatment Diagnosis Information:     Left shoulder pain - m25.512      Insurance/Certification information:     Physician Information:     Has the plan of care been signed (Y/N):        []  Yes  [x]  No     Date of Patient follow up with Physician:       Is this a Progress Report:     []  Yes  [x]  No        If Yes:  Date Range for reporting period:  Beginning  Ending    Progress report will be due (10 Rx or 30 days whichever is less):        Recertification will be due (POC Duration  / 90 days whichever is less):          Visit # Insurance Allowable Auth Required   11  []  Yes []  No        Functional Scale:     Date assessed:        Latex Allergy:  [x]NO      []YES  Preferred Language for Healthcare:   [x]English       []other:      Pain level:  0/10     SUBJECTIVE:  progress note   -  OBJECTIVE:    Observation:   progress note   Test measurements:      RESTRICTIONS/PRECAUTIONS:     Exercises/Interventions:       Therapeutic Ex (63519) Sets/sec Reps Notes/CUES   Er at 0 abd with cane   10 x 10\"          Rows   30x  Blue tb   Bicep stretch   8 x 30 sec In supine/PT assist         Bicep curl  X 30 2 lb   Tricep extensions   X 30 1#   Wall walk (limit at 140)  5 x 10\"    Supine elevations   25x    Pulleys   3'    Sidelying er   25x    Prone: rows, ext   25x, 25x     Seated elevations   20x (elbow flexion)    Manual Intervention (75068)      Prom/stm   25'                                                                                                                                            Therapeutic Exercise and NMR EXR  [x] (68353) Provided verbal/tactile cueing for activities related to strengthening, flexibility, endurance, ROM for improvements in LE, proximal hip, and core control with self care, mobility, lifting, ambulation. [x] (89012) Provided verbal/tactile cueing for activities related to improving balance, coordination, kinesthetic sense, posture, motor skill, proprioception to assist with LE, proximal hip, and core control in self-care, mobility, lifting, ambulation and eccentric single leg control.      NMR and Therapeutic Activities:    [x] (82072 or 80876) Provided verbal/tactile cueing for activities related to improving balance, coordination, kinesthetic sense, posture, motor skill, proprioception and motor activation to allow for proper function of core, proximal hip and LE with self-care and ADLs and functional mobility.   [] (25245) Gait Re-education- Provided training and instruction to the patient for proper LE, core and proximal hip recruitment and positioning and eccentric body weight control with ambulation re-education including up and down stairs     Home Exercise Program:    [x] (22788) Reviewed/Progressed HEP activities related to strengthening, flexibility, endurance, ROM of core, proximal hip and LE for functional self-care, mobility, lifting and ambulation/stair navigation   [] (80880) Reviewed/Progressed HEP activities related to improving balance, coordination, kinesthetic sense, posture, motor skill, proprioception of core, proximal hip and LE for self-care, mobility, lifting, and ambulation/stair navigation      Manual Treatments:  PROM / STM / Oscillations-Mobs:  G-I, II, III, IV (PA's, Inf., Post.)  [x] (33215) Provided manual therapy to mobilize LE, proximal hip and/or LS spine soft tissue/joints for the purpose of modulating pain, promoting relaxation, increasing ROM, reducing/eliminating soft tissue swelling/inflammation/restriction, improving soft tissue extensibility and Adjusted              Overall Progression Towards Functional goals/ Treatment Progress Update:  [] Patient is progressing as expected towards functional goals listed. [] Progression is slowed due to complexities/Impairments listed. [] Progression has been slowed due to co-morbidities. [x] Plan just implemented, too soon to assess goals progression <30days   [] Goals require adjustment due to lack of progress  [] Patient is not progressing as expected and requires additional follow up with physician  [] Other    Prognosis for POC: [x] Good [] Fair  [] Poor      Patient requires continued skilled intervention: [x] Yes  [] No    Treatment/Activity Tolerance:  [x] Patient able to complete treatment  [] Patient limited by fatigue  [] Patient limited by pain    [] Patient limited by other medical complications  [] Other: Patient PROM/AAROM ahead of schedule per protocol. Patient is progressing very well at this time. Cueing required for rows to keep UT relaxed. Patient would continue to benefit from continued PT to improve L shld ROM, periscapular and RTC strength, and improve functional ability. Return to Play: (if applicable)   []  Stage 1: Intro to Strength   []  Stage 2: Return to Run and Strength   []  Stage 3: Return to Jump and Strength   []  Stage 4: Dynamic Strength and Agility   []  Stage 5: Sport Specific Training     []  Ready to Return to Play, Meets All Above Stages   []  Not Ready for Return to Sports   Comments:                               PLAN: See eval  [x] Continue per plan of care [] Alter current plan (see comments above)  [] Plan of care initiated [] Hold pending MD visit [] Discharge      Electronically signed by:  Harriett Elizabeth, PT, MPT     Note: If patient does not return for scheduled/ recommended follow up visits, this note will serve as a discharge from care along with most recent update on progress.

## 2022-11-15 ENCOUNTER — HOSPITAL ENCOUNTER (OUTPATIENT)
Dept: PHYSICAL THERAPY | Age: 67
Setting detail: THERAPIES SERIES
Discharge: HOME | End: 2022-11-15
Payer: COMMERCIAL

## 2022-11-15 PROCEDURE — 97110 THERAPEUTIC EXERCISES: CPT | Performed by: PHYSICAL THERAPIST

## 2022-11-15 PROCEDURE — 97140 MANUAL THERAPY 1/> REGIONS: CPT | Performed by: PHYSICAL THERAPIST

## 2022-11-15 NOTE — FLOWSHEET NOTE
[facility] and [address],  Sports Performance and Rehabilitation, Formerly [facility] [address]99 07[address]  793 [facility],5Th Floor   Carlo Perez  Phone: [phone]  Fax: [phone]       Physical Therapy Treatment Note/ Progress Report:           Date:  2022    Patient Name:  Patti Morgan    :  1955  MRN: [id number]  Restrictions/Precautions:    Medical/Treatment Diagnosis Information:     Left shoulder pain - m25.512      Insurance/Certification information:     Physician Information:     Has the plan of care been signed (Y/N):        []  Yes  [x]  No     Date of Patient follow up with Physician:       Is this a Progress Report:     []  Yes  [x]  No        If Yes:  Date Range for reporting period:  Beginning  Ending    Progress report will be due (10 Rx or 30 days whichever is less):        Recertification will be due (POC Duration  / 90 days whichever is less):          Visit # Insurance Allowable Auth Required   12  []  Yes []  No        Functional Scale:     Date assessed:        Latex Allergy:  [x]NO      []YES  Preferred Language for Healthcare:   [x]English       []other:      Pain level:  0/10     SUBJECTIVE:  \"I am feeling good\"  -  OBJECTIVE:    Observation:     Test measurements:      RESTRICTIONS/PRECAUTIONS:     Exercises/Interventions:       Therapeutic Ex (78754) Sets/sec Reps Notes/CUES   Er at 0 abd with cane   10 x 10\"          Rows   30x  Blue tb   Bicep stretch   8 x 30 sec In supine/PT assist         Bicep curl  X 30 2 lb   Tricep extensions   X 30 1#   Wall walk (limit at 140)  5 x 10\"    Supine elevations   25x    Pulleys   3'    Sidelying er   25x    Prone: rows, ext   25x, 25x     Seated elevations   20x (elbow flexion)    Manual Intervention (11666)      Prom/stm   25'                                                                                                                                            Therapeutic Exercise and NMR EXR  [x] (84040) Provided verbal/tactile cueing for activities related to strengthening, flexibility, endurance, ROM for improvements in LE, proximal hip, and core control with self care, mobility, lifting, ambulation. [x] (53997) Provided verbal/tactile cueing for activities related to improving balance, coordination, kinesthetic sense, posture, motor skill, proprioception to assist with LE, proximal hip, and core control in self-care, mobility, lifting, ambulation and eccentric single leg control.      NMR and Therapeutic Activities:    [x] (59025 or 04540) Provided verbal/tactile cueing for activities related to improving balance, coordination, kinesthetic sense, posture, motor skill, proprioception and motor activation to allow for proper function of core, proximal hip and LE with self-care and ADLs and functional mobility.   [] (63864) Gait Re-education- Provided training and instruction to the patient for proper LE, core and proximal hip recruitment and positioning and eccentric body weight control with ambulation re-education including up and down stairs     Home Exercise Program:    [x] (94056) Reviewed/Progressed HEP activities related to strengthening, flexibility, endurance, ROM of core, proximal hip and LE for functional self-care, mobility, lifting and ambulation/stair navigation   [] (81434) Reviewed/Progressed HEP activities related to improving balance, coordination, kinesthetic sense, posture, motor skill, proprioception of core, proximal hip and LE for self-care, mobility, lifting, and ambulation/stair navigation      Manual Treatments:  PROM / STM / Oscillations-Mobs:  G-I, II, III, IV (PA's, Inf., Post.)  [x] (19705) Provided manual therapy to mobilize LE, proximal hip and/or LS spine soft tissue/joints for the purpose of modulating pain, promoting relaxation, increasing ROM, reducing/eliminating soft tissue swelling/inflammation/restriction, improving soft tissue extensibility and allowing for proper ROM for normal function with self-care, mobility, lifting and ambulation. Modalities:     [] GAME READY (VASO)- for significant edema, swelling, pain control. Charges:  Timed Code Treatment Minutes: 43'   Total Treatment Minutes: 43'      [] EVAL (LOW) 28354 (typically 20 minutes face-to-face)  [] EVAL (MOD) 95861 (typically 30 minutes face-to-face)  [] EVAL (HIGH) 57715 (typically 45 minutes face-to-face)  [] RE-EVAL     [x] CZ(94462)  X 1 [] IONTO   NMR (99907) x     [] VASO  [][x] Manual (45314) x   2 [] Other:  [] TA x      [] Mech Traction (94002)  [] ES(attended) (32892)      [] ES (un) (45287):         ASSESSMENT:  See eval      GOALS:   Patient stated goal:     [x] Progressing: [] Met: [] Not Met: [] Adjusted    Therapist goals for Patient:   Short Term Goals: To be achieved in: 2 weeks  1. Independent in HEP and progression per patient tolerance, in order to prevent re-injury. [x] Progressing: [] Met: [] Not Met: [] Adjusted   2. Patient will have a decrease in pain to facilitate improvement in movement, function, and ADLs as indicated by Functional Deficits. [x] Progressing: [] Met: [] Not Met: [] Adjusted    Long Term Goals: To be achieved in:    12 weeks  - The patient is expected to demonstrate less than 25% impairment, limitation or restriction in:  - carrying, moving and handling objects. - Patient will demonstrate increased passive ROM to a deficiency of only 15% to allow for proper joint functioning as indicated by patients Functional Deficits. [x] Progressing: [] Met: [] Not Met: [] Adjusted  - Patient will demonstrate an increase in Strength to 4-/5 to allow for proper functional mobility as indicated by patients Functional Deficits. [x] Progressing: [] Met: [] Not Met: [] Adjusted  - Patient will return to desired, higher level upper extremity functional activities without increased symptoms or restriction.        [x] Progressing: [] Met: [] Not Met: [] Adjusted              Overall Progression Towards Functional goals/ Treatment Progress Update:  [] Patient is progressing as expected towards functional goals listed. [] Progression is slowed due to complexities/Impairments listed. [] Progression has been slowed due to co-morbidities. [x] Plan just implemented, too soon to assess goals progression <30days   [] Goals require adjustment due to lack of progress  [] Patient is not progressing as expected and requires additional follow up with physician  [] Other    Prognosis for POC: [x] Good [] Fair  [] Poor      Patient requires continued skilled intervention: [x] Yes  [] No    Treatment/Activity Tolerance:  [x] Patient able to complete treatment  [] Patient limited by fatigue  [] Patient limited by pain    [] Patient limited by other medical complications  [] Other: Patient PROM/AAROM ahead of schedule per protocol. Patient is progressing very well at this time. Cueing required for rows to keep UT relaxed. Patient would continue to benefit from continued PT to improve L shld ROM, periscapular and RTC strength, and improve functional ability. Return to Play: (if applicable)   []  Stage 1: Intro to Strength   []  Stage 2: Return to Run and Strength   []  Stage 3: Return to Jump and Strength   []  Stage 4: Dynamic Strength and Agility   []  Stage 5: Sport Specific Training     []  Ready to Return to Play, Meets All Above Stages   []  Not Ready for Return to Sports   Comments:                               PLAN: See eval  [x] Continue per plan of care [] Alter current plan (see comments above)  [] Plan of care initiated [] Hold pending MD visit [] Discharge      Electronically signed by:  Aryan Grullon PT, MPT     Note: If patient does not return for scheduled/ recommended follow up visits, this note will serve as a discharge from care along with most recent update on progress.

## 2022-11-18 ENCOUNTER — HOSPITAL ENCOUNTER (OUTPATIENT)
Dept: PHYSICAL THERAPY | Age: 67
Setting detail: THERAPIES SERIES
Discharge: HOME | End: 2022-11-18
Payer: COMMERCIAL

## 2022-11-18 PROCEDURE — 97140 MANUAL THERAPY 1/> REGIONS: CPT | Performed by: PHYSICAL THERAPIST

## 2022-11-18 PROCEDURE — 97110 THERAPEUTIC EXERCISES: CPT | Performed by: PHYSICAL THERAPIST

## 2022-11-20 NOTE — FLOWSHEET NOTE
[facility] and [address],  Sports Performance and Rehabilitation, [facility] [address]199 [address]  793 [facility],5Th Floor   Carlo Perez  Phone: [phone]  Fax: [phone]       Physical Therapy Treatment Note/ Progress Report:           Date:  11/15/2022    Patient Name:  Kalyan Jackson    :  1955  MRN: [id number]  Restrictions/Precautions:    Medical/Treatment Diagnosis Information:     Left shoulder pain - m25.512      Insurance/Certification information:     Physician Information:     Has the plan of care been signed (Y/N):        []  Yes  [x]  No     Date of Patient follow up with Physician:       Is this a Progress Report:     []  Yes  [x]  No        If Yes:  Date Range for reporting period:  Beginning  Ending    Progress report will be due (10 Rx or 30 days whichever is less):        Recertification will be due (POC Duration  / 90 days whichever is less):          Visit # Insurance Allowable Auth Required   12  []  Yes []  No        Functional Scale:     Date assessed:        Latex Allergy:  [x]NO      []YES  Preferred Language for Healthcare:   [x]English       []other:      Pain level:  0/10     SUBJECTIVE:  \"feeling good\"  -  OBJECTIVE:    Observation:   4/5 safe zone   Test measurements:      RESTRICTIONS/PRECAUTIONS:     Exercises/Interventions:       Therapeutic Ex (19267) Sets/sec Reps Notes/CUES   Er at 0 abd with cane   10 x 10\"          Rows   30x  Blue tb   Bicep stretch   8 x 30 sec In supine/PT assist         Bicep curl  X 30 2 lb   Tricep extensions   X 30 1#   Wall walk (limit at 140)  5 x 10\"    Supine elevations   25x    Pulleys   3'    Sidelying er   25x    Prone: rows, ext   25x, 25x     Seated elevations   20x (elbow flexion)    Manual Intervention (43706)      Prom/stm   25'                                                                                                                                            Therapeutic Exercise and NMR EXR  [x] (19945) Provided verbal/tactile cueing for activities related to strengthening, flexibility, endurance, ROM for improvements in LE, proximal hip, and core control with self care, mobility, lifting, ambulation. [x] (41690) Provided verbal/tactile cueing for activities related to improving balance, coordination, kinesthetic sense, posture, motor skill, proprioception to assist with LE, proximal hip, and core control in self-care, mobility, lifting, ambulation and eccentric single leg control.      NMR and Therapeutic Activities:    [x] (84315 or 79169) Provided verbal/tactile cueing for activities related to improving balance, coordination, kinesthetic sense, posture, motor skill, proprioception and motor activation to allow for proper function of core, proximal hip and LE with self-care and ADLs and functional mobility.   [] (74896) Gait Re-education- Provided training and instruction to the patient for proper LE, core and proximal hip recruitment and positioning and eccentric body weight control with ambulation re-education including up and down stairs     Home Exercise Program:    [x] (49710) Reviewed/Progressed HEP activities related to strengthening, flexibility, endurance, ROM of core, proximal hip and LE for functional self-care, mobility, lifting and ambulation/stair navigation   [] (26473) Reviewed/Progressed HEP activities related to improving balance, coordination, kinesthetic sense, posture, motor skill, proprioception of core, proximal hip and LE for self-care, mobility, lifting, and ambulation/stair navigation      Manual Treatments:  PROM / STM / Oscillations-Mobs:  G-I, II, III, IV (PA's, Inf., Post.)  [x] (85214) Provided manual therapy to mobilize LE, proximal hip and/or LS spine soft tissue/joints for the purpose of modulating pain, promoting relaxation, increasing ROM, reducing/eliminating soft tissue swelling/inflammation/restriction, improving soft tissue extensibility and allowing for proper ROM for normal function with self-care, mobility, lifting and ambulation. Modalities:     [] GAME READY (VASO)- for significant edema, swelling, pain control. Charges:  Timed Code Treatment Minutes: 43'   Total Treatment Minutes: 43'      [] EVAL (LOW) 43330 (typically 20 minutes face-to-face)  [] EVAL (MOD) 70052 (typically 30 minutes face-to-face)  [] EVAL (HIGH) 34225 (typically 45 minutes face-to-face)  [] RE-EVAL     [x] KX(43628)  X 1 [] IONTO   NMR (78713) x     [] VASO  [][x] Manual (77246) x   2 [] Other:  [] TA x      [] Mech Traction (63591)  [] ES(attended) (38374)      [] ES (un) (78360):         ASSESSMENT:  See eval      GOALS:   Patient stated goal:     [x] Progressing: [] Met: [] Not Met: [] Adjusted    Therapist goals for Patient:   Short Term Goals: To be achieved in: 2 weeks  1. Independent in HEP and progression per patient tolerance, in order to prevent re-injury. [x] Progressing: [] Met: [] Not Met: [] Adjusted   2. Patient will have a decrease in pain to facilitate improvement in movement, function, and ADLs as indicated by Functional Deficits. [x] Progressing: [] Met: [] Not Met: [] Adjusted    Long Term Goals: To be achieved in:    12 weeks  - The patient is expected to demonstrate less than 25% impairment, limitation or restriction in:  - carrying, moving and handling objects. - Patient will demonstrate increased passive ROM to a deficiency of only 15% to allow for proper joint functioning as indicated by patients Functional Deficits. [x] Progressing: [] Met: [] Not Met: [] Adjusted  - Patient will demonstrate an increase in Strength to 4-/5 to allow for proper functional mobility as indicated by patients Functional Deficits. [x] Progressing: [] Met: [] Not Met: [] Adjusted  - Patient will return to desired, higher level upper extremity functional activities without increased symptoms or restriction.        [x] Progressing: [] Met: [] Not Met: [] Adjusted              Overall Progression Towards Functional goals/ Treatment Progress Update:  [] Patient is progressing as expected towards functional goals listed. [] Progression is slowed due to complexities/Impairments listed. [] Progression has been slowed due to co-morbidities. [x] Plan just implemented, too soon to assess goals progression <30days   [] Goals require adjustment due to lack of progress  [] Patient is not progressing as expected and requires additional follow up with physician  [] Other    Prognosis for POC: [x] Good [] Fair  [] Poor      Patient requires continued skilled intervention: [x] Yes  [] No    Treatment/Activity Tolerance:  [x] Patient able to complete treatment  [] Patient limited by fatigue  [] Patient limited by pain    [] Patient limited by other medical complications  [] Other: Patient PROM/AAROM ahead of schedule per protocol. Patient is progressing very well at this time. Cueing required for rows to keep UT relaxed. Patient would continue to benefit from continued PT to improve L shld ROM, periscapular and RTC strength, and improve functional ability. Return to Play: (if applicable)   []  Stage 1: Intro to Strength   []  Stage 2: Return to Run and Strength   []  Stage 3: Return to Jump and Strength   []  Stage 4: Dynamic Strength and Agility   []  Stage 5: Sport Specific Training     []  Ready to Return to Play, Meets All Above Stages   []  Not Ready for Return to Sports   Comments:                               PLAN: See eval  [x] Continue per plan of care [] Alter current plan (see comments above)  [] Plan of care initiated [] Hold pending MD visit [] Discharge      Electronically signed by:  Nona Tom, PT, MPT     Note: If patient does not return for scheduled/ recommended follow up visits, this note will serve as a discharge from care along with most recent update on progress.

## 2022-11-21 NOTE — FLOWSHEET NOTE
The [facility] and [address],  Sports Performance and Rehabilitation, [facility] [address]199 [address]  793 [facility],5Th Floor   Carlo Perez  Phone: [phone]  Fax: [phone]       Physical Therapy Treatment Note/ Progress Report:           Date:  2022    Patient Name:  Violet Salomon    :  1955  MRN: [id number]  Restrictions/Precautions:    Medical/Treatment Diagnosis Information:     Left shoulder pain - m25.512      Insurance/Certification information:     Physician Information:     Has the plan of care been signed (Y/N):        []  Yes  [x]  No     Date of Patient follow up with Physician:       Is this a Progress Report:     []  Yes  [x]  No        If Yes:  Date Range for reporting period:  Beginning  Ending    Progress report will be due (10 Rx or 30 days whichever is less):        Recertification will be due (POC Duration  / 90 days whichever is less):          Visit # Insurance Allowable Auth Required   13  []  Yes []  No        Functional Scale:     Date assessed:        Latex Allergy:  [x]NO      []YES  Preferred Language for Healthcare:   [x]English       []other:      Pain level:  0/10     SUBJECTIVE:  \"doing well\"  -  OBJECTIVE:    Observation:   4/5 safe zone   Test measurements:      RESTRICTIONS/PRECAUTIONS:     Exercises/Interventions:       Therapeutic Ex (36555) Sets/sec Reps Notes/CUES   Er at 0 abd with cane   10 x 10\"          Rows   30x  Blue tb   Bicep stretch   8 x 30 sec In supine/PT assist         Bicep curl  X 30 2 lb   Tricep extensions   X 30 1#   Wall walk (limit at 140)  5 x 10\"    Supine elevations   25x    Pulleys   3'    Sidelying er   25x    Prone: rows, ext   25x, 25x     Seated elevations   20x (elbow flexion)    Manual Intervention (96865)      Prom/stm   25'                                                                                                                                            Therapeutic Exercise and NMR EXR  [x] (92252) Provided verbal/tactile cueing for activities related to strengthening, flexibility, endurance, ROM for improvements in LE, proximal hip, and core control with self care, mobility, lifting, ambulation. [x] (63783) Provided verbal/tactile cueing for activities related to improving balance, coordination, kinesthetic sense, posture, motor skill, proprioception to assist with LE, proximal hip, and core control in self-care, mobility, lifting, ambulation and eccentric single leg control.      NMR and Therapeutic Activities:    [x] (30152 or 35775) Provided verbal/tactile cueing for activities related to improving balance, coordination, kinesthetic sense, posture, motor skill, proprioception and motor activation to allow for proper function of core, proximal hip and LE with self-care and ADLs and functional mobility.   [] (16790) Gait Re-education- Provided training and instruction to the patient for proper LE, core and proximal hip recruitment and positioning and eccentric body weight control with ambulation re-education including up and down stairs     Home Exercise Program:    [x] (51103) Reviewed/Progressed HEP activities related to strengthening, flexibility, endurance, ROM of core, proximal hip and LE for functional self-care, mobility, lifting and ambulation/stair navigation   [] (58326) Reviewed/Progressed HEP activities related to improving balance, coordination, kinesthetic sense, posture, motor skill, proprioception of core, proximal hip and LE for self-care, mobility, lifting, and ambulation/stair navigation      Manual Treatments:  PROM / STM / Oscillations-Mobs:  G-I, II, III, IV (PA's, Inf., Post.)  [x] (19994) Provided manual therapy to mobilize LE, proximal hip and/or LS spine soft tissue/joints for the purpose of modulating pain, promoting relaxation, increasing ROM, reducing/eliminating soft tissue swelling/inflammation/restriction, improving soft tissue extensibility and allowing for proper ROM for normal function with self-care, mobility, lifting and ambulation. Modalities:     [] GAME READY (VASO)- for significant edema, swelling, pain control. Charges:  Timed Code Treatment Minutes: 43'   Total Treatment Minutes: 43'      [] EVAL (LOW) 20612 (typically 20 minutes face-to-face)  [] EVAL (MOD) 79964 (typically 30 minutes face-to-face)  [] EVAL (HIGH) 62020 (typically 45 minutes face-to-face)  [] RE-EVAL     [x] XG(52700)  X 1 [] IONTO   NMR (20216) x     [] VASO  [][x] Manual (98690) x   2 [] Other:  [] TA x      [] Mech Traction (91329)  [] ES(attended) (13014)      [] ES (un) (67324):         ASSESSMENT:  See eval      GOALS:   Patient stated goal:     [x] Progressing: [] Met: [] Not Met: [] Adjusted    Therapist goals for Patient:   Short Term Goals: To be achieved in: 2 weeks  1. Independent in HEP and progression per patient tolerance, in order to prevent re-injury. [x] Progressing: [] Met: [] Not Met: [] Adjusted   2. Patient will have a decrease in pain to facilitate improvement in movement, function, and ADLs as indicated by Functional Deficits. [x] Progressing: [] Met: [] Not Met: [] Adjusted    Long Term Goals: To be achieved in:    12 weeks  - The patient is expected to demonstrate less than 25% impairment, limitation or restriction in:  - carrying, moving and handling objects. - Patient will demonstrate increased passive ROM to a deficiency of only 15% to allow for proper joint functioning as indicated by patients Functional Deficits. [x] Progressing: [] Met: [] Not Met: [] Adjusted  - Patient will demonstrate an increase in Strength to 4-/5 to allow for proper functional mobility as indicated by patients Functional Deficits. [x] Progressing: [] Met: [] Not Met: [] Adjusted  - Patient will return to desired, higher level upper extremity functional activities without increased symptoms or restriction.        [x] Progressing: [] Met: [] Not Met: [] Adjusted              Overall Progression Towards Functional goals/ Treatment Progress Update:  [] Patient is progressing as expected towards functional goals listed. [] Progression is slowed due to complexities/Impairments listed. [] Progression has been slowed due to co-morbidities. [x] Plan just implemented, too soon to assess goals progression <30days   [] Goals require adjustment due to lack of progress  [] Patient is not progressing as expected and requires additional follow up with physician  [] Other    Prognosis for POC: [x] Good [] Fair  [] Poor      Patient requires continued skilled intervention: [x] Yes  [] No    Treatment/Activity Tolerance:  [x] Patient able to complete treatment  [] Patient limited by fatigue  [] Patient limited by pain    [] Patient limited by other medical complications  [] Other: Patient PROM/AAROM ahead of schedule per protocol. Patient is progressing very well at this time. Cueing required for rows to keep UT relaxed. Patient would continue to benefit from continued PT to improve L shld ROM, periscapular and RTC strength, and improve functional ability. Return to Play: (if applicable)   []  Stage 1: Intro to Strength   []  Stage 2: Return to Run and Strength   []  Stage 3: Return to Jump and Strength   []  Stage 4: Dynamic Strength and Agility   []  Stage 5: Sport Specific Training     []  Ready to Return to Play, Meets All Above Stages   []  Not Ready for Return to Sports   Comments:                               PLAN: See eval  [x] Continue per plan of care [] Alter current plan (see comments above)  [] Plan of care initiated [] Hold pending MD visit [] Discharge      Electronically signed by:  Joy Coffman PT, MPT     Note: If patient does not return for scheduled/ recommended follow up visits, this note will serve as a discharge from care along with most recent update on progress.

## 2022-11-23 ENCOUNTER — OFFICE VISIT (OUTPATIENT)
Dept: ORTHOPEDIC SURGERY | Age: 67
End: 2022-11-23

## 2022-11-23 ENCOUNTER — HOSPITAL ENCOUNTER (OUTPATIENT)
Dept: PHYSICAL THERAPY | Age: 67
Setting detail: THERAPIES SERIES
Discharge: HOME | End: 2022-11-23
Payer: COMMERCIAL

## 2022-11-23 VITALS — WEIGHT: 208 LBS | HEIGHT: 73 IN | BODY MASS INDEX: 27.57 KG/M2

## 2022-11-23 DIAGNOSIS — Z96.612 STATUS POST TOTAL REPLACEMENT OF LEFT SHOULDER: Primary | ICD-10-CM

## 2022-11-23 PROCEDURE — 97110 THERAPEUTIC EXERCISES: CPT | Performed by: PHYSICAL THERAPIST

## 2022-11-23 PROCEDURE — 97140 MANUAL THERAPY 1/> REGIONS: CPT | Performed by: PHYSICAL THERAPIST

## 2022-11-23 PROCEDURE — 99024 POSTOP FOLLOW-UP VISIT: CPT | Performed by: PHYSICIAN ASSISTANT

## 2022-11-23 NOTE — PROGRESS NOTES
History of Present Illness:  Emmie Marina is a 61 y.o. male who presents for a post operative visit. The patient underwent a left anatomic total shoulder arthroplasty on 9/12/2022. Patient reports his recovery has been seamless and is quite happy with his shoulder especially during this recovery. He continues to go to physical therapy twice a week. Patient denies any pain with the shoulder. He reports he has been compliant with his restrictions. The patient deny fevers, chills, numbness, tingling, and shortness of breath. Medical History:  Patient's medications, allergies, past medical, surgical, social and family histories were reviewed and updated as appropriate. No notes on file    Review of Systems  A 14 point review of systems was completed by the patient is available in the media section of the scanned medical record and was reviewed on 11/23/2022. Vital Signs: There were no vitals filed for this visit. General/Appearance: Alert and oriented and in no apparent distress. Skin:  There are no skin lesions, cellulitis, or extreme edema. The patient has warm and well-perfused Bilateral upper extremities with brisk capillary refill. left Shoulder Exam:    Inspection: left shoulder incision that is clean, dry and intact and well approximated. Mild ecchymosis and swelling are present as can be expected. There is no erythema, drainage or other signs of infection    Palpation:  No crepitus to gentle motion    Active Range of Motion: Forward elevation of 150 degrees, external rotation of 20 and internal rotation to L4  Passive Range of Motion: Internal rotation the back to L2    Strength:  Deferred    Special Tests: Negative Varney, weak belly press,    Neurovascular: Sensation to light touch is intact, no motor deficits, palpable radial pulses 2+    Radiology:     Plain radiographs not obtained today.        Assessment :  Mr. Emmie Marina is a 61 y.o. patient who underwent a left anatomic total shoulder arthroplasty on 9/12/2022      Impression:  Encounter Diagnosis   Name Primary? Status post total replacement of left shoulder Yes       Office Procedures:  No orders of the defined types were placed in this encounter. Treatment Plan:    Continues to do well in his recovery. We recommend that he continue to start working on the strength program now. Therapy orders were updated and a printout was handed to the patient today. All of his questions were fully answered today. We would like to see him back in 6 weeks for follow-up visit. 5:27 PM    Kyleigh Casarez PA-C  Orthopaedic Sports Medicine Physician Assistant      This dictation was performed with a verbal recognition program [facility]) and it was checked for errors. It is possible that there are still dictated errors within this office note. If so, please bring any errors to my attention for an addendum. All efforts were made to ensure that this office note is accurate.

## 2022-11-23 NOTE — LETTER
Physical Therapy Rehabilitation Referral    Patient Name:  Madhu Taylor      YOB: 1955    Diagnosis:    1. Status post total replacement of left shoulder        Precautions:  Subscapularis     [x] Evaluate and Treat    Post Op Instructions:  [] Continuous passive motion (CPM)  [] Elbow ROM  [x] Exercise in plane of scapula  []  Strengthening     [x] Pulley and instruction   [x] Home exercise program (copy to patient)   [] Sling when arm at risk  [] Sling or brace at all times   [x] AAROM: Forward elevation to  140            [x] AAROM: External rotation  To  40    [] Isometric external rotator strengthening [x] AAROM: internal rotation: up the back  [x] Isometric abductor strengthening  [x] AAROM: Internal abduction   [] Isometric internal rotator strengthening [x] AAROM: cross-body adduction             Stretching:     Strengthening:  [x] Four quadrant (FE, ER, IR, CBA)  [x] Rotator cuff (ER, IR, Abd)  [x] Forward Elevation    [] External Rotators     [x] External Rotation    [] Internal Rotators  [x] Internal Rotation: up/back   [] Abductors     [x] Internal Rotation: supine in abduction  [x] Sleeper Stretch    [] Flexors  [x] Cross-body abduction    [] Extensors  [x] Pendulum (FE, Abd/Add, cw/ccw)  [x] Scapular Stabilizers   [x] Wall-walking (FE, Abd)        [x] Shoulder shrugs     [x] Table slides (FE)                [x] Rhomboid pinch  [] Elbow (flex, ext, pron, sup)        [] Lat.  Pull downs     [] Medial epicondylitis program       [] Forward punch   [] Lateral epicondylitis program       [] Internal rotators     [] Progressive resistive exercises  [] Bench Press        [] Bench press plus  Activities:     [] Lateral pull-downs  [] Rowing     [x] Progressive two-hand supine press  [] Stepper/Exercise bike   [x] Biceps: curls/supination  [] Swimming  [] Water exercises    Modalities:     Return to Sport:  [x] Of Choice      [] Plyometrics  [] Ultrasound     [] Rhythmic stabilization  [] Iontophoresis    [] Core strengthening   [] Moist heat     [x] Sports specific program: golf  [] Massage         [x] Cryotherapy      [] Electrical stimulation     [] Paraffin  [] Whirlpool  [] TENS    [x] Home exercise program (copy to patient). Perform exercises for:   15     minutes    3      times/day  [x] Supervised physical therapy  Frequency: []  1x week  [x] 2x week  [] 3x week  [] Other:   Duration: [] 2 weeks   [] 4 weeks  [x] 6 weeks  [] Other:     Additional Instructions:     Amada Almazan MD, PhD

## 2022-11-26 NOTE — FLOWSHEET NOTE
The [facility] and [address] [place] [address]. [address],  Sports Performance and Rehabilitation, [facility] [address]199 12[address]  [address] [facility],[address]   Carlo Perez [place]  Phone: [phone]  Fax: [phone]       Physical Therapy Treatment Note/ Progress Report:           Date:  2022    Patient Name:  Sarthak Kauffman    :  1955  MRN: [id number]  Restrictions/Precautions:    Medical/Treatment Diagnosis Information:     Left shoulder pain - m25.512      Insurance/Certification information:     Physician Information:     Has the plan of care been signed (Y/N):        []  Yes  [x]  No     Date of Patient follow up with Physician:       Is this a Progress Report:     []  Yes  [x]  No        If Yes:  Date Range for reporting period:  Beginning  Ending    Progress report will be due (10 Rx or 30 days whichever is less):        Recertification will be due (POC Duration  / 90 days whichever is less):          Visit # Insurance Allowable Auth Required   14  []  Yes []  No        Functional Scale:     Date assessed:        Latex Allergy:  [x]NO      []YES  Preferred Language for Healthcare:   [x]English       []other:      Pain level:  0/10     SUBJECTIVE:  \"feeling good. .. getting stronger\"  -  OBJECTIVE:    Observation:   45 safe zone   Test measurements:      RESTRICTIONS/PRECAUTIONS:     Exercises/Interventions:       Therapeutic Ex (32333) Sets/sec Reps Notes/CUES               Rows/ext's    30x/25x  Blue tb   Bicep stretch   8 x 30 sec In supine/PT assist   Supine punches/alphabet/rhyth stab  30x/2x/5 x 20\"    Bicep curl  X 30 2 lb   Tricep extensions   X 30 1#   Wall walk   5 x 10\"    Supine elevations   25x mr    Pulleys   3'    Sidelying er   25x    Prone: rows, ext, m trap    25x, 25x     Standing elevations     Wall alphabet   25x 1#    2x     Manual Intervention (30254)      Prom/stm   15' Therapeutic Exercise and NMR EXR  [x] (78805) Provided verbal/tactile cueing for activities related to strengthening, flexibility, endurance, ROM for improvements in LE, proximal hip, and core control with self care, mobility, lifting, ambulation. [x] (17949) Provided verbal/tactile cueing for activities related to improving balance, coordination, kinesthetic sense, posture, motor skill, proprioception to assist with LE, proximal hip, and core control in self-care, mobility, lifting, ambulation and eccentric single leg control.      NMR and Therapeutic Activities:    [x] (55678 or 19034) Provided verbal/tactile cueing for activities related to improving balance, coordination, kinesthetic sense, posture, motor skill, proprioception and motor activation to allow for proper function of core, proximal hip and LE with self-care and ADLs and functional mobility.   [] (23311) Gait Re-education- Provided training and instruction to the patient for proper LE, core and proximal hip recruitment and positioning and eccentric body weight control with ambulation re-education including up and down stairs     Home Exercise Program:    [x] (50156) Reviewed/Progressed HEP activities related to strengthening, flexibility, endurance, ROM of core, proximal hip and LE for functional self-care, mobility, lifting and ambulation/stair navigation   [] (24743) Reviewed/Progressed HEP activities related to improving balance, coordination, kinesthetic sense, posture, motor skill, proprioception of core, proximal hip and LE for self-care, mobility, lifting, and ambulation/stair navigation      Manual Treatments:  PROM / STM / Oscillations-Mobs:  G-I, II, III, IV (PA's, Inf., Post.)  [x] (30104) Provided manual therapy to mobilize LE, proximal hip and/or LS spine soft tissue/joints for the purpose of modulating pain, promoting relaxation, increasing ROM, reducing/eliminating soft tissue swelling/inflammation/restriction, improving soft tissue extensibility and allowing for proper ROM for normal function with self-care, mobility, lifting and ambulation. Modalities:     [] GAME READY (VASO)- for significant edema, swelling, pain control. Charges:  Timed Code Treatment Minutes: 43'   Total Treatment Minutes: 43'      [] EVAL (LOW) 10535 (typically 20 minutes face-to-face)  [] EVAL (MOD) 46877 (typically 30 minutes face-to-face)  [] EVAL (HIGH) 37518 (typically 45 minutes face-to-face)  [] RE-EVAL     [x] AZ(45368)  X 2 [] IONTO   NMR (99268) x     [] VASO  [][x] Manual (33218) x  1 [] Other:  [] TA x      [] Mech Traction (48658)  [] ES(attended) (99687)      [] ES (un) (32332):         ASSESSMENT:  See eval      GOALS:   Patient stated goal:     [x] Progressing: [] Met: [] Not Met: [] Adjusted    Therapist goals for Patient:   Short Term Goals: To be achieved in: 2 weeks  1. Independent in HEP and progression per patient tolerance, in order to prevent re-injury. [x] Progressing: [] Met: [] Not Met: [] Adjusted   2. Patient will have a decrease in pain to facilitate improvement in movement, function, and ADLs as indicated by Functional Deficits. [x] Progressing: [] Met: [] Not Met: [] Adjusted    Long Term Goals: To be achieved in:    12 weeks  - The patient is expected to demonstrate less than 25% impairment, limitation or restriction in:  - carrying, moving and handling objects. - Patient will demonstrate increased passive ROM to a deficiency of only 15% to allow for proper joint functioning as indicated by patients Functional Deficits. [x] Progressing: [] Met: [] Not Met: [] Adjusted  - Patient will demonstrate an increase in Strength to 4-/5 to allow for proper functional mobility as indicated by patients Functional Deficits.    [x] Progressing: [] Met: [] Not Met: [] Adjusted  - Patient will return to desired, higher level upper extremity functional activities without increased symptoms or restriction. [x] Progressing: [] Met: [] Not Met: [] Adjusted              Overall Progression Towards Functional goals/ Treatment Progress Update:  [] Patient is progressing as expected towards functional goals listed. [] Progression is slowed due to complexities/Impairments listed. [] Progression has been slowed due to co-morbidities. [x] Plan just implemented, too soon to assess goals progression <30days   [] Goals require adjustment due to lack of progress  [] Patient is not progressing as expected and requires additional follow up with physician  [] Other    Prognosis for POC: [x] Good [] Fair  [] Poor      Patient requires continued skilled intervention: [x] Yes  [] No    Treatment/Activity Tolerance:  [x] Patient able to complete treatment  [] Patient limited by fatigue  [] Patient limited by pain    [] Patient limited by other medical complications  [] Other: Patient PROM/AAROM ahead of schedule per protocol. Patient is progressing very well at this time. Cueing required for rows to keep UT relaxed. Patient would continue to benefit from continued PT to improve L shld ROM, periscapular and RTC strength, and improve functional ability. Return to Play: (if applicable)   []  Stage 1: Intro to Strength   []  Stage 2: Return to Run and Strength   []  Stage 3: Return to Jump and Strength   []  Stage 4: Dynamic Strength and Agility   []  Stage 5: Sport Specific Training     []  Ready to Return to Play, Meets All Above Stages   []  Not Ready for Return to Sports   Comments:                               PLAN: See eval  [x] Continue per plan of care [] Alter current plan (see comments above)  [] Plan of care initiated [] Hold pending MD visit [] Discharge      Electronically signed by:  Glory Jackson, PT, MPT     Note: If patient does not return for scheduled/ recommended follow up visits, this note will serve as a discharge from care along with most recent update on progress.

## 2022-11-29 ENCOUNTER — HOSPITAL ENCOUNTER (OUTPATIENT)
Dept: PHYSICAL THERAPY | Age: 67
Setting detail: THERAPIES SERIES
Discharge: HOME | End: 2022-11-29
Payer: COMMERCIAL

## 2022-11-29 PROCEDURE — 97110 THERAPEUTIC EXERCISES: CPT | Performed by: PHYSICAL THERAPIST

## 2022-11-29 PROCEDURE — 97140 MANUAL THERAPY 1/> REGIONS: CPT | Performed by: PHYSICAL THERAPIST

## 2022-11-29 NOTE — FLOWSHEET NOTE
The [facility] and [address],  Sports Performance and Rehabilitation, [facility] [address]99 [address]  [address] [place] [address],[address]   Carlo Perez  Phone: [phone]  Fax: [phone]       Physical Therapy Treatment Note/ Progress Report:           Date:  2022    Patient Name:  Gracy George    :  1955  MRN: [id number]  Restrictions/Precautions:    Medical/Treatment Diagnosis Information:     Left shoulder pain - m25.512      Insurance/Certification information:     Physician Information:     Has the plan of care been signed (Y/N):        []  Yes  [x]  No     Date of Patient follow up with Physician:       Is this a Progress Report:     []  Yes  [x]  No        If Yes:  Date Range for reporting period:  Beginning  Ending    Progress report will be due (10 Rx or 30 days whichever is less):        Recertification will be due (POC Duration  / 90 days whichever is less):          Visit # Insurance Allowable Auth Required   15  []  Yes []  No        Functional Scale:     Date assessed:        Latex Allergy:  [x]NO      []YES  Preferred Language for Healthcare:   [x]English       []other:      Pain level:  0/10     SUBJECTIVE:  \"doing well overall\"  -  OBJECTIVE:    Observation:   4/5 safe zone   Test measurements:      RESTRICTIONS/PRECAUTIONS:     Exercises/Interventions:       Therapeutic Ex (40708) Sets/sec Reps Notes/CUES               Rows/ext's    30x/25x  Blue tb   Bicep stretch   8 x 30 sec In supine/PT assist   Supine punches/alphabet/rhyth stab  30x/2x/5 x 20\"    Bicep curl  X 30 2 lb   Tricep extensions   X 30 1#   Wall walk   5 x 10\"    Supine elevations   25x mr    Pulleys   3'    Sidelying er   25x    Prone: rows, ext, m trap    25x, 25x     Standing elevations     Wall alphabet   25x 1#    2x     Manual Intervention (34853)      Prom/stm   15' Therapeutic Exercise and NMR EXR  [x] (25656) Provided verbal/tactile cueing for activities related to strengthening, flexibility, endurance, ROM for improvements in LE, proximal hip, and core control with self care, mobility, lifting, ambulation. [x] (49974) Provided verbal/tactile cueing for activities related to improving balance, coordination, kinesthetic sense, posture, motor skill, proprioception to assist with LE, proximal hip, and core control in self-care, mobility, lifting, ambulation and eccentric single leg control.      NMR and Therapeutic Activities:    [x] (95921 or 13588) Provided verbal/tactile cueing for activities related to improving balance, coordination, kinesthetic sense, posture, motor skill, proprioception and motor activation to allow for proper function of core, proximal hip and LE with self-care and ADLs and functional mobility.   [] (83064) Gait Re-education- Provided training and instruction to the patient for proper LE, core and proximal hip recruitment and positioning and eccentric body weight control with ambulation re-education including up and down stairs     Home Exercise Program:    [x] (56045) Reviewed/Progressed HEP activities related to strengthening, flexibility, endurance, ROM of core, proximal hip and LE for functional self-care, mobility, lifting and ambulation/stair navigation   [] (77104) Reviewed/Progressed HEP activities related to improving balance, coordination, kinesthetic sense, posture, motor skill, proprioception of core, proximal hip and LE for self-care, mobility, lifting, and ambulation/stair navigation      Manual Treatments:  PROM / STM / Oscillations-Mobs:  G-I, II, III, IV (PA's, Inf., Post.)  [x] (01449) Provided manual therapy to mobilize LE, proximal hip and/or LS spine soft tissue/joints for the purpose of modulating pain, promoting relaxation, increasing ROM, reducing/eliminating soft tissue swelling/inflammation/restriction, improving soft tissue extensibility and allowing for proper ROM for normal function with self-care, mobility, lifting and ambulation. Modalities:     [] GAME READY (VASO)- for significant edema, swelling, pain control. Charges:  Timed Code Treatment Minutes: 43'   Total Treatment Minutes: 43'      [] EVAL (LOW) 75132 (typically 20 minutes face-to-face)  [] EVAL (MOD) 02849 (typically 30 minutes face-to-face)  [] EVAL (HIGH) 13352 (typically 45 minutes face-to-face)  [] RE-EVAL     [x] SA(89563)  X 2 [] IONTO   NMR (33088) x     [] VASO  [][x] Manual (09640) x  1 [] Other:  [] TA x      [] Mech Traction (04122)  [] ES(attended) (55199)      [] ES (un) (24527):         ASSESSMENT:  See eval      GOALS:   Patient stated goal:     [x] Progressing: [] Met: [] Not Met: [] Adjusted    Therapist goals for Patient:   Short Term Goals: To be achieved in: 2 weeks  1. Independent in HEP and progression per patient tolerance, in order to prevent re-injury. [x] Progressing: [] Met: [] Not Met: [] Adjusted   2. Patient will have a decrease in pain to facilitate improvement in movement, function, and ADLs as indicated by Functional Deficits. [x] Progressing: [] Met: [] Not Met: [] Adjusted    Long Term Goals: To be achieved in:    12 weeks  - The patient is expected to demonstrate less than 25% impairment, limitation or restriction in:  - carrying, moving and handling objects. - Patient will demonstrate increased passive ROM to a deficiency of only 15% to allow for proper joint functioning as indicated by patients Functional Deficits. [x] Progressing: [] Met: [] Not Met: [] Adjusted  - Patient will demonstrate an increase in Strength to 4-/5 to allow for proper functional mobility as indicated by patients Functional Deficits. [x] Progressing: [] Met: [] Not Met: [] Adjusted  - Patient will return to desired, higher level upper extremity functional activities without increased symptoms or restriction.        [x] Progressing: [] Met: [] Not Met: [] Adjusted              Overall Progression Towards Functional goals/ Treatment Progress Update:  [] Patient is progressing as expected towards functional goals listed. [] Progression is slowed due to complexities/Impairments listed. [] Progression has been slowed due to co-morbidities. [x] Plan just implemented, too soon to assess goals progression <30days   [] Goals require adjustment due to lack of progress  [] Patient is not progressing as expected and requires additional follow up with physician  [] Other    Prognosis for POC: [x] Good [] Fair  [] Poor      Patient requires continued skilled intervention: [x] Yes  [] No    Treatment/Activity Tolerance:  [x] Patient able to complete treatment  [] Patient limited by fatigue  [] Patient limited by pain    [] Patient limited by other medical complications  [] Other: Patient PROM/AAROM ahead of schedule per protocol. Patient is progressing very well at this time. Cueing required for rows to keep UT relaxed. Patient would continue to benefit from continued PT to improve L shld ROM, periscapular and RTC strength, and improve functional ability. Return to Play: (if applicable)   []  Stage 1: Intro to Strength   []  Stage 2: Return to Run and Strength   []  Stage 3: Return to Jump and Strength   []  Stage 4: Dynamic Strength and Agility   []  Stage 5: Sport Specific Training     []  Ready to Return to Play, Meets All Above Stages   []  Not Ready for Return to Sports   Comments:                               PLAN: See eval  [x] Continue per plan of care [] Alter current plan (see comments above)  [] Plan of care initiated [] Hold pending MD visit [] Discharge      Electronically signed by:  Tc Bailey PT, MPT     Note: If patient does not return for scheduled/ recommended follow up visits, this note will serve as a discharge from care along with most recent update on progress.

## 2022-12-02 ENCOUNTER — HOSPITAL ENCOUNTER (OUTPATIENT)
Dept: PHYSICAL THERAPY | Age: 67
Setting detail: THERAPIES SERIES
Discharge: HOME | End: 2022-12-02
Payer: COMMERCIAL

## 2022-12-02 PROCEDURE — 97140 MANUAL THERAPY 1/> REGIONS: CPT | Performed by: PHYSICAL THERAPIST

## 2022-12-02 PROCEDURE — 97110 THERAPEUTIC EXERCISES: CPT | Performed by: PHYSICAL THERAPIST

## 2022-12-07 ENCOUNTER — HOSPITAL ENCOUNTER (OUTPATIENT)
Dept: PHYSICAL THERAPY | Age: 67
Setting detail: THERAPIES SERIES
Discharge: HOME | End: 2022-12-07
Payer: COMMERCIAL

## 2022-12-07 PROCEDURE — 97110 THERAPEUTIC EXERCISES: CPT | Performed by: PHYSICAL THERAPIST

## 2022-12-07 PROCEDURE — 97140 MANUAL THERAPY 1/> REGIONS: CPT | Performed by: PHYSICAL THERAPIST

## 2022-12-09 ENCOUNTER — APPOINTMENT (OUTPATIENT)
Dept: PHYSICAL THERAPY | Age: 67
End: 2022-12-09
Payer: COMMERCIAL

## 2022-12-12 NOTE — FLOWSHEET NOTE
[facility] and [address],  Sports Performance and Rehabilitation, [facility] [address]  [address] [facility],5[address]   Carlo Perez  Phone: [phone]  Fax: [phone]       Physical Therapy Treatment Note/ Progress Report:           Date:  2022    Patient Name:  David Bob    :  1955  MRN: [id number]  Restrictions/Precautions:    Medical/Treatment Diagnosis Information:     Left shoulder pain - m25.512      Insurance/Certification information:     Physician Information:     Has the plan of care been signed (Y/N):        []  Yes  [x]  No     Date of Patient follow up with Physician:       Is this a Progress Report:     []  Yes  [x]  No        If Yes:  Date Range for reporting period:  Beginning  Ending    Progress report will be due (10 Rx or 30 days whichever is less):        Recertification will be due (POC Duration  / 90 days whichever is less):          Visit # Insurance Allowable Auth Required   17  []  Yes []  No        Functional Scale:     Date assessed:        Latex Allergy:  [x]NO      []YES  Preferred Language for Healthcare:   [x]English       []other:      Pain level:  0/10     SUBJECTIVE:  \"feeling good\"  -  OBJECTIVE:    Observation:   4/5 safe zone   Test measurements:      RESTRICTIONS/PRECAUTIONS:     Exercises/Interventions:       Therapeutic Ex (53407) Sets/sec Reps Notes/CUES               Rows/ext's    30x/25x  Blue tb   Bicep stretch   8 x 30 sec In supine/PT assist   Supine punches/alphabet/rhyth stab  30x/2x/5 x 20\"    Bicep curl  X 30 2 lb   Tricep extensions   X 30 1#   Wall walk   5 x 10\"    Supine elevations   25x mr    Pulleys   3'    Sidelying er   25x    Prone: rows, ext, m trap    25x, 25x     Standing elevations     Wall alphabet   25x 1#    2x     Manual Intervention (65846)      Prom/stm   15' Therapeutic Exercise and NMR EXR  [x] (13021) Provided verbal/tactile cueing for activities related to strengthening, flexibility, endurance, ROM for improvements in LE, proximal hip, and core control with self care, mobility, lifting, ambulation. [x] (54413) Provided verbal/tactile cueing for activities related to improving balance, coordination, kinesthetic sense, posture, motor skill, proprioception to assist with LE, proximal hip, and core control in self-care, mobility, lifting, ambulation and eccentric single leg control.      NMR and Therapeutic Activities:    [x] (82467 or 29785) Provided verbal/tactile cueing for activities related to improving balance, coordination, kinesthetic sense, posture, motor skill, proprioception and motor activation to allow for proper function of core, proximal hip and LE with self-care and ADLs and functional mobility.   [] (73886) Gait Re-education- Provided training and instruction to the patient for proper LE, core and proximal hip recruitment and positioning and eccentric body weight control with ambulation re-education including up and down stairs     Home Exercise Program:    [x] (29565) Reviewed/Progressed HEP activities related to strengthening, flexibility, endurance, ROM of core, proximal hip and LE for functional self-care, mobility, lifting and ambulation/stair navigation   [] (71336) Reviewed/Progressed HEP activities related to improving balance, coordination, kinesthetic sense, posture, motor skill, proprioception of core, proximal hip and LE for self-care, mobility, lifting, and ambulation/stair navigation      Manual Treatments:  PROM / STM / Oscillations-Mobs:  G-I, II, III, IV (PA's, Inf., Post.)  [x] (01720) Provided manual therapy to mobilize LE, proximal hip and/or LS spine soft tissue/joints for the purpose of modulating pain, promoting relaxation, increasing ROM, reducing/eliminating soft tissue swelling/inflammation/restriction, improving soft tissue extensibility and allowing for proper ROM for normal function with self-care, mobility, lifting and ambulation. Modalities:     [] GAME READY (VASO)- for significant edema, swelling, pain control. Charges:  Timed Code Treatment Minutes: 43'   Total Treatment Minutes: 43'      [] EVAL (LOW) 23901 (typically 20 minutes face-to-face)  [] EVAL (MOD) 23175 (typically 30 minutes face-to-face)  [] EVAL (HIGH) 56717 (typically 45 minutes face-to-face)  [] RE-EVAL     [x] OJ(07343)  X 2 [] IONTO   NMR (76372) x     [] VASO  [][x] Manual (73360) x  1 [] Other:  [] TA x      [] Mech Traction (73078)  [] ES(attended) (88538)      [] ES (un) (92589):         ASSESSMENT:  See eval      GOALS:   Patient stated goal:     [x] Progressing: [] Met: [] Not Met: [] Adjusted    Therapist goals for Patient:   Short Term Goals: To be achieved in: 2 weeks  1. Independent in HEP and progression per patient tolerance, in order to prevent re-injury. [x] Progressing: [] Met: [] Not Met: [] Adjusted   2. Patient will have a decrease in pain to facilitate improvement in movement, function, and ADLs as indicated by Functional Deficits. [x] Progressing: [] Met: [] Not Met: [] Adjusted    Long Term Goals: To be achieved in:    12 weeks  - The patient is expected to demonstrate less than 25% impairment, limitation or restriction in:  - carrying, moving and handling objects. - Patient will demonstrate increased passive ROM to a deficiency of only 15% to allow for proper joint functioning as indicated by patients Functional Deficits. [x] Progressing: [] Met: [] Not Met: [] Adjusted  - Patient will demonstrate an increase in Strength to 4-/5 to allow for proper functional mobility as indicated by patients Functional Deficits. [x] Progressing: [] Met: [] Not Met: [] Adjusted  - Patient will return to desired, higher level upper extremity functional activities without increased symptoms or restriction.        [x] Progressing: [] Met: [] Not Met: [] Adjusted              Overall Progression Towards Functional goals/ Treatment Progress Update:  [] Patient is progressing as expected towards functional goals listed. [] Progression is slowed due to complexities/Impairments listed. [] Progression has been slowed due to co-morbidities. [x] Plan just implemented, too soon to assess goals progression <30days   [] Goals require adjustment due to lack of progress  [] Patient is not progressing as expected and requires additional follow up with physician  [] Other    Prognosis for POC: [x] Good [] Fair  [] Poor      Patient requires continued skilled intervention: [x] Yes  [] No    Treatment/Activity Tolerance:  [x] Patient able to complete treatment  [] Patient limited by fatigue  [] Patient limited by pain    [] Patient limited by other medical complications  [] Other: Patient PROM/AAROM ahead of schedule per protocol. Patient is progressing very well at this time. Cueing required for rows to keep UT relaxed. Patient would continue to benefit from continued PT to improve L shld ROM, periscapular and RTC strength, and improve functional ability. Return to Play: (if applicable)   []  Stage 1: Intro to Strength   []  Stage 2: Return to Run and Strength   []  Stage 3: Return to Jump and Strength   []  Stage 4: Dynamic Strength and Agility   []  Stage 5: Sport Specific Training     []  Ready to Return to Play, Meets All Above Stages   []  Not Ready for Return to Sports   Comments:                               PLAN: See eval  [x] Continue per plan of care [] Alter current plan (see comments above)  [] Plan of care initiated [] Hold pending MD visit [] Discharge      Electronically signed by:  Nae Ray PT, MPT     Note: If patient does not return for scheduled/ recommended follow up visits, this note will serve as a discharge from care along with most recent update on progress.

## 2023-01-10 ENCOUNTER — OFFICE VISIT (OUTPATIENT)
Dept: ORTHOPEDIC SURGERY | Age: 68
End: 2023-01-10

## 2023-01-10 VITALS — WEIGHT: 208 LBS | HEIGHT: 73 IN | BODY MASS INDEX: 27.57 KG/M2

## 2023-01-10 DIAGNOSIS — Z96.612 STATUS POST TOTAL SHOULDER ARTHROPLASTY, LEFT: Primary | ICD-10-CM

## 2023-01-10 NOTE — PROGRESS NOTES
Chief Complaint    Follow-up (Left Shoulder)      History of Present Illness:  Neelam May is a pleasant, 74 y.o., male, here today for follow up of left anatomic total shoulder. Completed on 9/12/2022. He has been discharged in physical therapy and has been working on strengthening exercises at home. Overall he reports that he has had minimal to no pain since surgery and is thrilled with the outcome. He reports 2 to 3 weeks ago he did also return to [employer] golf and had no issues with returning to use . He reports no new injuries or setbacks. Pain Assessment  Location of Pain: Shoulder  Location Modifiers: Left  Severity of Pain: 0  Limiting Behavior: No  Work-Related Injury: No  Are there other pain locations you wish to document?: No      Medical History:  Patient's medications, allergies, past medical, surgical, social and family histories were reviewed and updated as appropriate. No notes on file    Review of Systems  A 14 point review of systems was completed by the patient and is available in the media section of the scanned medical record and was reviewed on 1/10/2023. The review is negative with the exception of those things mentioned in the HPI and Past Medical History    Vital Signs:  Vitals:       General/Appearance: Alert and oriented and in no apparent distress. Skin:  There are no skin lesions, cellulitis, or extreme edema. The patient has warm and well-perfused Bilateral upper extremities with brisk capillary refill. Left shoulder Exam:  Inspection:  No gross deformities, no signs of infection. Palpation: No tenderness to palpation    Active Range of Motion: Forward Elevation 160, Abduction 140, External Rotation 20, Internal Rotation L2    Passive Range of Motion: As above    Strength:  External Rotation 5/5, Internal Rotation 5/5, Champagne Toast 5/5, Supraspinatus 5/5    Special Tests:   No Onofre muscle deformity.     Neurovascular: Sensation to light touch is intact, no motor deficits, palpable radial pulses 2+      Radiology:     No new XR obtained at this time. Assessment :  Mr. Kaelyn Hobbs is a pleasant, 74 y.o. patient who is 4 months out post anatomic left total shoulder arthroplasty. He is doing great. Impression:  Encounter Diagnosis   Name Primary? Status post total shoulder arthroplasty, left Yes       Office Procedures:  No orders of the defined types were placed in this encounter. Treatment Plan: We had a good discussion with Yung Domingo today in clinic. Overall he is doing very well postop left anatomic total shoulder arthroplasty. He is very happy with results and continues to progress with home exercises    We will see Yung Uribepatt back at the 1 year joaquina in September or as needed. All questions were answered to patient's satisfaction and He was encouraged to call with any further questions or concerns. Antonio Oviedo is in agreement with this plan. Greater than 20 minutes were expended on all aspects of today's visit. 1/10/2023  10:19 AM    Kelsie Atwood MD [facility]    Orthopaedic Surgeon, Clinical Fellow  Gay Santos     The encounter with the patient was supervised by Dr. Emerson Farrar who personally examined the patient and reviewed the plan. This dictation was performed with a verbal recognition program (DRAGON) and it was checked for errors. It is possible that there are still dictated errors within this office note. If so, please bring any errors to my attention for an addendum. All efforts were made to ensure that this office note is accurate.  _____________________  I was physically present and personally supervised the Orthopaedic Sports Medicine Fellow in the evaluation and development of a treatment plan for this patient. I personally interviewed the patient and performed a physical examination. In addition, I discussed the patient's condition and treatment options with them.  I have also reviewed and agree with the past medical, family and social history unless otherwise noted. All of the patient's questions were answered. Amada Lambert MD, PhD  1/10/2023

## 2023-01-30 ENCOUNTER — TELEPHONE (OUTPATIENT)
Dept: ORTHOPEDIC SURGERY | Age: 68
End: 2023-01-30

## 2023-01-30 RX ORDER — AMOXICILLIN 500 MG/1
CAPSULE ORAL
Qty: 4 CAPSULE | Refills: 0 | Status: SHIPPED | OUTPATIENT
Start: 2023-01-30

## 2023-01-30 NOTE — TELEPHONE ENCOUNTER
Other [facility] IN [place] NEEDS TO KNOW IF PATIENT WILL NEED PREMED BEFORE DENTAL PROCEDURE. PLS CALL OFFICE TO CONFIRM [phone].  PLS CALL PATIENT AS WELL TO ADVISE [phone]

## 2023-10-11 ENCOUNTER — OFFICE VISIT (OUTPATIENT)
Dept: ORTHOPEDIC SURGERY | Age: 68
End: 2023-10-11

## 2023-10-11 VITALS — HEIGHT: 73 IN | BODY MASS INDEX: 27.83 KG/M2 | WEIGHT: 210 LBS

## 2023-10-11 DIAGNOSIS — Z96.612 STATUS POST TOTAL SHOULDER ARTHROPLASTY, LEFT: Primary | ICD-10-CM

## 2023-10-11 NOTE — PROGRESS NOTES
[facility] [address]  History and Physical  Shoulder Pain    Date:  10/11/2023    Name:  Maira Meyers  Address:  [address] [place] [address]    :  1955      Age:   74 y.o.    SSN:  [id number]      Medical Record Number:  [id number]    Reason for Visit:    Shoulder Pain (F/U LEFT SHOULDER)      HPI:   Maira Meyers is a 74 y.o. male who presents to our office today for follow up of the left shoulder pain. He underwent a left anatomic total shoulder arthroplasty on 2022. He is one year out from his left shoulder surgery. He reports he feels a 100% better today after his surgery. He remains very active and plays tennis and golf. He denies any new injuries. Pain Assessment  Location of Pain: Shoulder  Location Modifiers: Left  Severity of Pain: 0  Limiting Behavior: No  Work-Related Injury: No  Are there other pain locations you wish to document?: No    No notes on file    Review of Systems:  A 14 point review of systems available in the scanned medical record as documented by the patient and reviewed on 10/11/2023. The review is negative with the exception of those things mentioned in the History of Present Illness and Past Medical History. Past Medical History:  Patient's medications, allergies, past medical, surgical, social and family histories were reviewed and updated as appropriate. Allergies: Allergies   Allergen Reactions    Ciprofloxacin Other (See Comments)     cramping       Physical Exam:  There were no vitals filed for this visit. General: Maira Meyers is a healthy and well appearing 74 y.o. male who is sitting comfortably in our office in acute distress. Skin:  There are no skin lesions, cellulitis, or extreme edema. The patient has warm and well-perfused Bilateral upper extremities with brisk capillary refill.     Eyes: Extra-ocular muscles intact  Mouth: Oral mucosa moist. No perioral lesions  Pulm:

## 2024-02-14 ENCOUNTER — TELEPHONE (OUTPATIENT)
Dept: ORTHOPEDIC SURGERY | Age: 69
End: 2024-02-14

## 2024-02-14 RX ORDER — AMOXICILLIN 500 MG/1
CAPSULE ORAL
Qty: 4 CAPSULE | Refills: 0 | Status: SHIPPED | OUTPATIENT
Start: 2024-02-14

## 2024-02-14 NOTE — TELEPHONE ENCOUNTER
Prescription Refill     Medication Name:  ANTIBIOTIC  Pharmacy: [facility] [id number] - [address] [place] [phone] - F [phone]  Patient Contact Number:  [phone]    PATIENT HAVING PROCEDURE TODAY

## 2024-08-20 ENCOUNTER — TELEPHONE (OUTPATIENT)
Dept: ORTHOPEDIC SURGERY | Age: 69
End: 2024-08-20

## 2024-08-20 RX ORDER — AMOXICILLIN 500 MG/1
CAPSULE ORAL
Qty: 4 CAPSULE | Refills: 0 | Status: SHIPPED | OUTPATIENT
Start: 2024-08-20

## 2024-08-20 NOTE — TELEPHONE ENCOUNTER
Prescription Refill     Medication Name:  ANTIBOTICS FOR DENTIST APPT 8-  Pharmacy: [facility] [address] [place] [address] [phone]  Patient Contact Number:  [phone]

## (undated) DEVICE — NEEDLE SPNL L3.5IN PNK HUB S STL REG WALL FIT STYL W/ QNCKE

## (undated) DEVICE — SUTURE VCRL SZ 0 L18IN ABSRB UD L36MM CT-1 1/2 CIR J840D

## (undated) DEVICE — ALTIVATE ANAT PT MATCHED GLEN DRL GUID

## (undated) DEVICE — INTENDED FOR TISSUE SEPARATION, AND OTHER PROCEDURES THAT REQUIRE A SHARP SURGICAL BLADE TO PUNCTURE OR CUT.: Brand: BARD-PARKER ® CARBON RIB-BACK BLADES

## (undated) DEVICE — ALTIVATE ANATOMIC, GUIDE WIRE, 2.4MM: Brand: DJO SURGICAL

## (undated) DEVICE — SOLUTION IV IRRIG WATER 1000ML POUR BRL 2F7114

## (undated) DEVICE — SLING ARM M FOR 11-13IN UNIV PREM QUAL BRTH EXTRA PD FAB W/

## (undated) DEVICE — SUTURE VCRL SZ 2-0 L18IN ABSRB UD CT-1 L36MM 1/2 CIR J839D

## (undated) DEVICE — GOWN,REINFRCE,POLY,ECLIPSE,SLV,3XLG: Brand: MEDLINE

## (undated) DEVICE — GOWN,SIRUS,POLYRNF,BRTHSLV,XLN/XXL,18/CS: Brand: MEDLINE

## (undated) DEVICE — TOWEL,STOP FLAG GOLD N-W: Brand: MEDLINE

## (undated) DEVICE — 3M™ IOBAN™ 2 ANTIMICROBIAL INCISE DRAPE 6640EZ: Brand: IOBAN™ 2

## (undated) DEVICE — DUAL CUT SAGITTAL BLADE

## (undated) DEVICE — SUTURE ETHBND EXCEL SZ 2 L30IN NONABSORBABLE GRN L40MM V-37 MX69G

## (undated) DEVICE — TOTAL SHOULDER: Brand: MEDLINE INDUSTRIES, INC.

## (undated) DEVICE — GOWN,SIRUS,POLYRNF,BRTHSLV,XL,30/CS: Brand: MEDLINE

## (undated) DEVICE — GLOVE ORANGE PI 8   MSG9080

## (undated) DEVICE — SUTURE MCRYL SZ 4-0 L18IN ABSRB UD L19MM PS-2 3/8 CIR PRIM Y496G

## (undated) DEVICE — SOLUTION IRRIG 3000ML 0.9% SOD CHL USP UROMATIC PLAS CONT

## (undated) DEVICE — UNDERGLOVE SURG SZ 8 BLU LTX FREE SYN POLYISOPRENE POLYMER

## (undated) DEVICE — SYSTEM SKIN CLSR 22CM DERMBND PRINEO

## (undated) DEVICE — 3 BONE CEMENT MIXER: Brand: MIXEVAC

## (undated) DEVICE — PAD DRY FLOOR ABS 32X58IN GRN

## (undated) DEVICE — NEEDLE SUT SZ 4 MAYO CATGUT 1/2 CIR TAPR PNT DISP

## (undated) DEVICE — 3M™ COBAN™ NL STERILE NON-LATEX SELF-ADHERENT WRAP, 2086S, 6 IN X 5 YD (15 CM X 4,5 M), 12 ROLLS/CASE: Brand: 3M™ COBAN™

## (undated) DEVICE — SOLUTION IV 1000ML 0.9% SOD CHL